# Patient Record
Sex: MALE | Race: BLACK OR AFRICAN AMERICAN | NOT HISPANIC OR LATINO | Employment: FULL TIME | URBAN - METROPOLITAN AREA
[De-identification: names, ages, dates, MRNs, and addresses within clinical notes are randomized per-mention and may not be internally consistent; named-entity substitution may affect disease eponyms.]

---

## 2017-10-26 ENCOUNTER — HOSPITAL ENCOUNTER (EMERGENCY)
Facility: CLINIC | Age: 27
Discharge: HOME | End: 2017-10-26
Attending: EMERGENCY MEDICINE | Admitting: EMERGENCY MEDICINE
Payer: COMMERCIAL

## 2017-10-26 ENCOUNTER — APPOINTMENT (OUTPATIENT)
Dept: GENERAL RADIOLOGY | Facility: CLINIC | Age: 27
End: 2017-10-26
Attending: EMERGENCY MEDICINE
Payer: COMMERCIAL

## 2017-10-26 VITALS
HEART RATE: 92 BPM | OXYGEN SATURATION: 100 % | RESPIRATION RATE: 16 BRPM | DIASTOLIC BLOOD PRESSURE: 111 MMHG | TEMPERATURE: 98.5 F | WEIGHT: 178.35 LBS | HEIGHT: 66 IN | SYSTOLIC BLOOD PRESSURE: 168 MMHG | BODY MASS INDEX: 28.66 KG/M2

## 2017-10-26 DIAGNOSIS — S16.1XXA STRAIN OF NECK MUSCLE, INITIAL ENCOUNTER: ICD-10-CM

## 2017-10-26 DIAGNOSIS — S39.012A BACK STRAIN, INITIAL ENCOUNTER: ICD-10-CM
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
## 2017-10-26 PROCEDURE — 25000132 ZZH RX MED GY IP 250 OP 250 PS 637: Performed by: EMERGENCY MEDICINE

## 2017-10-26 PROCEDURE — 99283 EMERGENCY DEPT VISIT LOW MDM: CPT

## 2017-10-26 PROCEDURE — 72040 X-RAY EXAM NECK SPINE 2-3 VW: CPT

## 2017-10-26 RX ORDER — IBUPROFEN 600 MG/1
600 TABLET, FILM COATED ORAL ONCE
Status: COMPLETED | OUTPATIENT
Start: 2017-10-26 | End: 2017-10-26

## 2017-10-26 RX ADMIN — IBUPROFEN 600 MG: 600 TABLET ORAL at 21:58

## 2017-10-26 ASSESSMENT — ENCOUNTER SYMPTOMS
NECK PAIN: 1
BACK PAIN: 1
NUMBNESS: 0
ABDOMINAL PAIN: 0
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
## 2017-10-26 NOTE — ED AVS SNAPSHOT
Emergency Department    [address] [place] [address]    Phone:  [phone]    Fax:  [phone]                                       Sugar Holland   MRN: [id number]    Department:   Emergency Department   Date of Visit:  10/26/2017           After Visit Summary Signature Page     I have received my discharge instructions, and my questions have been answered. I have discussed any challenges I see with this plan with the nurse or doctor.    ..........................................................................................................................................  Patient/Patient Representative Signature      ..........................................................................................................................................  Patient Representative Print Name and Relationship to Patient    ..................................................               ................................................  Date                                            Time    ..........................................................................................................................................  Reviewed by Signature/Title    ...................................................              ..............................................  Date                                                            Time
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
## 2020-05-20 ENCOUNTER — TRANSCRIBE ORDERS (OUTPATIENT)
Dept: OTHER | Age: 30
End: 2020-05-20

## 2021-12-14 ENCOUNTER — LAB REQUISITION (OUTPATIENT)
Dept: LAB | Facility: CLINIC | Age: 31
End: 2021-12-14

## 2021-12-14 DIAGNOSIS — E10.319 TYPE 1 DIABETES MELLITUS WITH UNSPECIFIED DIABETIC RETINOPATHY WITHOUT MACULAR EDEMA (H): ICD-10-CM

## 2021-12-14 DIAGNOSIS — I10 ESSENTIAL (PRIMARY) HYPERTENSION: ICD-10-CM

## 2021-12-14 LAB
ALBUMIN SERPL-MCNC: 2 G/DL (ref 3.4–5)
ANION GAP SERPL CALCULATED.3IONS-SCNC: 8 MMOL/L (ref 3–14)
BUN SERPL-MCNC: 28 MG/DL (ref 7–30)
CALCIUM SERPL-MCNC: 8.4 MG/DL (ref 8.5–10.1)
CHLORIDE BLD-SCNC: 112 MMOL/L (ref 94–109)
CO2 SERPL-SCNC: 24 MMOL/L (ref 20–32)
CREAT SERPL-MCNC: 4.18 MG/DL (ref 0.66–1.25)
CREAT UR-MCNC: 85 MG/DL
GFR SERPL CREATININE-BSD FRML MDRD: 18 ML/MIN/1.73M2
GLUCOSE BLD-MCNC: 285 MG/DL (ref 70–99)
MICROALBUMIN UR-MCNC: 4670 MG/L
MICROALBUMIN/CREAT UR: 5494.12 MG/G CR (ref 0–17)
PHOSPHATE SERPL-MCNC: 3 MG/DL (ref 2.5–4.5)
POTASSIUM BLD-SCNC: 3.6 MMOL/L (ref 3.4–5.3)
SODIUM SERPL-SCNC: 144 MMOL/L (ref 133–144)

## 2021-12-14 PROCEDURE — 82043 UR ALBUMIN QUANTITATIVE: CPT | Performed by: INTERNAL MEDICINE

## 2021-12-14 PROCEDURE — 80069 RENAL FUNCTION PANEL: CPT | Performed by: INTERNAL MEDICINE

## 2022-01-14 ENCOUNTER — TRANSFERRED RECORDS (OUTPATIENT)
Dept: HEALTH INFORMATION MANAGEMENT | Facility: CLINIC | Age: 32
End: 2022-01-14

## 2022-03-24 ENCOUNTER — TRANSFERRED RECORDS (OUTPATIENT)
Dept: HEALTH INFORMATION MANAGEMENT | Facility: CLINIC | Age: 32
End: 2022-03-24

## 2022-09-27 ENCOUNTER — MEDICAL CORRESPONDENCE (OUTPATIENT)
Dept: HEALTH INFORMATION MANAGEMENT | Facility: CLINIC | Age: 32
End: 2022-09-27

## 2022-09-28 ENCOUNTER — LAB REQUISITION (OUTPATIENT)
Dept: LAB | Facility: CLINIC | Age: 32
End: 2022-09-28

## 2022-09-28 DIAGNOSIS — E10.319 TYPE 1 DIABETES MELLITUS WITH UNSPECIFIED DIABETIC RETINOPATHY WITHOUT MACULAR EDEMA (H): ICD-10-CM

## 2022-09-28 LAB
CHOLEST SERPL-MCNC: 170 MG/DL
HDLC SERPL-MCNC: 39 MG/DL
LDLC SERPL CALC-MCNC: 90 MG/DL
NONHDLC SERPL-MCNC: 131 MG/DL
TRIGL SERPL-MCNC: 206 MG/DL

## 2022-09-28 PROCEDURE — 80061 LIPID PANEL: CPT | Performed by: FAMILY MEDICINE

## 2022-10-20 ENCOUNTER — REFERRAL (OUTPATIENT)
Dept: TRANSPLANT | Facility: CLINIC | Age: 32
End: 2022-10-20

## 2022-10-20 DIAGNOSIS — I10 ESSENTIAL HYPERTENSION: ICD-10-CM

## 2022-10-20 DIAGNOSIS — Z01.818 ENCOUNTER FOR PRE-TRANSPLANT EVALUATION FOR KIDNEY AND PANCREAS TRANSPLANT: ICD-10-CM

## 2022-10-20 DIAGNOSIS — Z76.82 ORGAN TRANSPLANT CANDIDATE: ICD-10-CM

## 2022-10-20 DIAGNOSIS — Z87.891 HISTORY OF TOBACCO USE: ICD-10-CM

## 2022-10-20 DIAGNOSIS — N18.9 CHRONIC RENAL FAILURE: ICD-10-CM

## 2022-10-20 DIAGNOSIS — E10.9 TYPE 1 DIABETES MELLITUS (H): ICD-10-CM

## 2022-10-20 DIAGNOSIS — N18.5 CHRONIC KIDNEY DISEASE, STAGE 5, KIDNEY FAILURE (H): Primary | ICD-10-CM

## 2022-10-20 DIAGNOSIS — N18.5 CHRONIC KIDNEY DISEASE, STAGE V (H): ICD-10-CM

## 2022-10-20 DIAGNOSIS — E10.9 DIABETES MELLITUS TYPE 1 (H): ICD-10-CM

## 2022-10-20 NOTE — LETTER
October 28, 2022        Sugar Holland  [address]  [facility] [address]      Dear Sugar,       You have recently expressed interest in our Solid Organ Transplant Program and have requested to begin the evaluation process.  We have made several attempts to get in touch with you but have been unable to reach you.    If you are still interested and/or have any questions, please contact us at  [phone] or [phone] and ask to speak with the .      Monday - Friday, between the hours of 8:00am and 5:00pm central time.    We look forward to hearing from you.      Regards,     Solid Organ Transplant Intake Team  [place] [address] [facility]  [address] [facility] [address]  [place] [address]  
  Sugar Holland  [address]  [facility] [address]                November 25, 2022          Dear Sugar,       You have recently expressed interest in our Solid Organ Transplant Program and have requested to begin the evaluation process.  We have made several attempts to get in touch with you but have been unable to reach you.    If you are still interested and/or have any questions, please contact us at  [phone] or [phone] and ask to speak with the .      Monday - Friday, between the hours of 8:00am and 5:00pm central time.    We look forward to hearing from you.      Regards,     Solid Organ Transplant Intake Team  [facility]  [address]  [address]  [place] [address]    
December 26, 2022    Sugar Holland  [address]  [place] [address]      Dear Sugar,    Thank you for your interest in the Transplant Center at [facility]. We look forward to being a part of your care team and assisting you through the transplant process.    As we discussed, your transplant coordinator is aDrcy Ingram (Pancreas, Kidney).  You may call your coordinator at any time with questions or concerns at call [phone].    Please complete the following.    1. Fill out and return the enclosed forms    Authorization for Electronic Communication    Authorization to Discuss Protected Health Information    Authorization for Release of Protected Health Information    Authorization for Care Everywhere Release of Information    2. Sign up for:    Mor.sl, access to your electronic medical record (see enclosed pamphlet)    [email or web address], a transplant education website    You can use these tools to learn more about your transplant, communicate with your care team, and track your medical details      Sincerely,      Solid Organ Transplant  [facility]    cc: Referring Physician and PCP   
(4) rarely moist

## 2022-11-08 NOTE — TELEPHONE ENCOUNTER
The Intake Pod has attempted to reach patient via phone x 3, with the numbers provided in chart. Trying to reach you letter also mailed to patient over seven days ago, with no response from patient. Per work flow process, patient is being removed from the intake pods reporting work bench. The assigned RN coordinator for this patient will be notified that Intake Pod was unable to reach patient to initiate referral process.      PRERNA Joseph, LPN   Transplant

## 2022-11-17 NOTE — TELEPHONE ENCOUNTER
Dr. Roman called to check on status of the referral he placed. Advised we attempted to reach out to patient and have not had a response.    Provider stated number on file was incorrect and is requesting we reach out to patient to complete referral.     Writer updated contact number for patient.

## 2022-12-16 VITALS — HEIGHT: 66 IN | WEIGHT: 190 LBS | BODY MASS INDEX: 30.53 KG/M2

## 2022-12-16 NOTE — TELEPHONE ENCOUNTER
PCP: Dr. Millie Leonardo  Referring Provider: Dr. William Roman   Referring Diagnosis: CKD Stage 5/Type 1 DM     GFR/Date: 12    Is patient under the age of 65? Yes  Is patient diabetic? Yes  Is patient on insulin? Yes  Was patient offered a pancreas transplant referral? Yes    Is patient in a group home/assisted living? No  Does patient have a guardian? No    Referral intake process completed.  Patient is aware that after financial approval is received, medical records will be requested.   Patient confirmed for a callback from transplant coordinator on 12/26/22. (within 2 weeks)  Tentative evaluation date 1/18/23 (within 4 weeks) if appointment is virtual, does patient have capabilities of setting this up? No    Confirmed coordinator will discuss evaluation process in more detail at the time of their call.   Patient is aware of the need to arrange age appropriate cancer screening, vaccinations, and dental care.  Reminded patient to complete questionnaire, complete medical records release, and review packet prior to evaluation visit .  Assessed patient for special needs (ie-wheelchair, assistance, guardian, and ):  None   Patient instructed to call [phone] with questions.     Patient gave verbal consent during intake call to obtain medical records and documents outside of [facility]:  Yes     PRERNA Joseph, LPN   Transplant

## 2022-12-26 NOTE — TELEPHONE ENCOUNTER
Contacted patient and introduced myself as their Transplant Coordinator, also introduced the role of the Transplant Coordinator in the transplant process.  Explained the purpose of this call including reviewing next steps and answering questions.    Confirmed Referring Provider, Dialysis Center, and Primary Care Physician. Notified patient of the importance of continued communication with referring providers and primary care physicians.    Reviewed components of transplant evaluation process including necessary appointments, tests, and procedures.    Answered questions for patient regarding evaluation, provided my name and contact information and requested they call with any additional questions.    Determined that patient would like additional information regarding transplant by:     Drop Down choices: Mail, Email, MyChart, Phone Call   Encourage MyChart   Notified patients that they will hear from a Transplant  to schedule evaluation.       Reviewed pt's chart for pre-Kidney/pancreas transplant evaluation planning. Pt lives in [place], [place]. Pt has CKD likely d/t diabetic nephropathy. Pt is not yet on dialysis and follows w/ Dr. Roman.  Pt is a type 1 diabetic, diagnosed at age 13.  He uses a Dex-com sensor and gives himself insulini njections. Other hx includes HTN.  BMI 31.  Dental: not UTD.  Pt is a former smoker - reports he smoked for about 7 years.  He denies issues w/ alcohol and recreational drug abuse. Pt is indepdent w/ ADLs.  Pt lives alone, encouraged him to start thinking about who could be his potential caregiver.  Pt states his father is a potential donor.       I also introduced [employer] and asked pt to create an account and view pre-kidney transplant videos for review with me following evaluation. Confirmed STD 1/18/23. Informed pt they will hear from scheduling to arrange the evaluation. Smartset orders entered, chart routed to scheduling pool.

## 2023-01-12 ENCOUNTER — MEDICAL CORRESPONDENCE (OUTPATIENT)
Dept: HEALTH INFORMATION MANAGEMENT | Facility: CLINIC | Age: 33
End: 2023-01-12

## 2023-01-17 LAB
A1 AB TITR SERPL: 16 {TITER}
A1 AB TITR SERPL: 16 {TITER}
A2 AB TITR SERPL: 2 {TITER}
A2 AB TITR SERPL: 2 {TITER}
ABO/RH(D): NORMAL
ABO/RH(D): NORMAL
ANTIBODY SCREEN: NEGATIVE
ANTIBODY TITER IGM SCREEN: NEGATIVE
SPECIMEN EXPIRATION DATE: NORMAL

## 2023-01-17 NOTE — PROGRESS NOTES
"[facility]  Outpatient MNT: Kidney Pancreas Transplant Evaluation    Current BMI: 31.6 (HT 66.5 in,  lbs/90 kg)  BMI is within recommendation of <35 for KP transplant  Ideal BMI for pancreas transplant 30-32 / per surgeon discretion    Frailty Assessment -- Not Frail (2/5 points)- reduced , low activity      Time Spent: 15 minutes  Visit Type: Initial   Referring Physician: Adebayo  Pt accompanied by: his mom     History of previous txp: none   Dialysis: no     Nutrition Assessment  H/o DM I  Frequency of BG checks: dexcom   Hypoglycemia: 1x/day (50-70), unawareness: some  Last A1c: 8.6 (1/12/23)    Endocrinologist manages insulin; pt sees endo every 3 months   Does not meet with CDE for fine tuning insulin regimen  He reports he is on a set insulin regimen of taking 25 units with meals and then for snacks he \"wings it\"- giving himself a range of insulin.    - Appetite: good/baseline   - Food allergies/intolerances: none  - Meal prep & grocery shopping: pt does   - Previous RD education: no   - Issues chewing or swallowing: no   - N/V/D/C: no   - Food access concerns: no     Vitamins, Supplements, Pertinent Meds: none  Herbal Medicines/Supplements: Sanar Colon Cleanse- not daily; ashwagandha; another supplement that he could not recall     Sanar Colon Cleanser (includes many herbals, but below with negative documented adverse effects)  - Cascara: some concern with possible liver injury  - Aloe vera: some concern for liver injury + Orally, aloe latex can cause hemorrhagic gastritis, nephritis, and acute kidney failure following prolonged use of high doses (1 gram daily or more)  - Horsetail: some case reports of hyponatremia     Edema: yes     Weight hx: up/down within 25 lbs over the past year   Wt Readings from Last 10 Encounters:   01/18/23 90.3 kg (199 lb)   12/16/22 86.2 kg (190 lb)   10/26/17 80.9 kg (178 lb 5.6 oz)   06/20/16 74.8 kg (165 lb)   06/09/14 79.4 kg (175 " lb)     Diet Recall  Breakfast Parikh & eggs with potatoes; sausage/parikh/eggs/cheese on english muffin   Lunch Slice of pizza; hit or miss based on schedule    Dinner Tacos x 2 (homemade); ground beef with cream of mushroom soup + garlic bread    Snacks Chips, fruit   Beverages Water, 1/2 pop a few times/week (Pepsi), some juice    Alcohol None    Dining out 2x/month      Physical Activity  None; no physical limitations       Labs  11/17/22 K 4.6 Phos 4.6    Nutrition Diagnosis  Obesity r/t positive energy balance and inadequate physical activity AEB BMI 31.6.    Nutrition Intervention  Nutrition education provided:  Discussed sodium intake (low sodium foods and drinks, seasoning food without salt and tips for low sodium diet).  Look for LS canned soup, taco seasoning, etc. Pt reports he adds a lot of salt to foods, so we discussed cutting back on this as well.   Reviewed wnl K/Phos levels, which do not warrant further dietary modification at this time.   Reviewed BG management. Could meet with CDE for more fine tuning of insulin. We discussed benefits of a lower cho diet for both BG management, stability, and weight loss. If he does have interest in doing this, he needs to talk w/ endo about adjusting insulin regimen to perhaps an I:C ratio. Reviewed some examples of what a low cho diet would look like: eggs/parikh without starch or with low carb bread; taco salad or tacos on low carb tortilla; add more veggies in with ground beef and do less garlic bread, etc. Strongly recommended avoiding juice/pop.     Reviewed post txp diet guidelines in brief (will review in further detail post txp):  (1) Review of proper food safety measures d/t immunosuppressant therapy post-op and increased risk for food-borne illness    (2) Avoid the following post txp d/t risk for rejection, unknown effects on the organs, and/or potential interactions with immunosuppressants:  - Herbal, Chinese, holistic, chiropractic, natural, alternative  medicines and supplements  - Detoxes and cleanses  - Weight loss pills  - Protein powders or other products with extracts or herbs (ie green tea extract)    (3) Med regimen and possible side effects    Patient Understanding: Pt verbalized understanding of education provided.  Expected Engagement: Good  Follow-Up Plans: PRN     Nutrition Goals  Weight loss per surgeon discretion for txp    Janeth Frias RD, LD, CCTD

## 2023-01-18 ENCOUNTER — ALLIED HEALTH/NURSE VISIT (OUTPATIENT)
Dept: TRANSPLANT | Facility: CLINIC | Age: 33
End: 2023-01-18
Attending: NURSE PRACTITIONER
Payer: COMMERCIAL

## 2023-01-18 ENCOUNTER — APPOINTMENT (OUTPATIENT)
Dept: TRANSPLANT | Facility: CLINIC | Age: 33
End: 2023-01-18
Attending: STUDENT IN AN ORGANIZED HEALTH CARE EDUCATION/TRAINING PROGRAM
Payer: COMMERCIAL

## 2023-01-18 ENCOUNTER — DOCUMENTATION ONLY (OUTPATIENT)
Dept: TRANSPLANT | Facility: CLINIC | Age: 33
End: 2023-01-18

## 2023-01-18 ENCOUNTER — LAB (OUTPATIENT)
Dept: LAB | Facility: CLINIC | Age: 33
End: 2023-01-18
Payer: COMMERCIAL

## 2023-01-18 ENCOUNTER — ANCILLARY PROCEDURE (OUTPATIENT)
Dept: GENERAL RADIOLOGY | Facility: CLINIC | Age: 33
End: 2023-01-18
Attending: NURSE PRACTITIONER
Payer: COMMERCIAL

## 2023-01-18 ENCOUNTER — ANCILLARY PROCEDURE (OUTPATIENT)
Dept: CARDIOLOGY | Facility: CLINIC | Age: 33
End: 2023-01-18
Attending: NURSE PRACTITIONER
Payer: COMMERCIAL

## 2023-01-18 VITALS
HEART RATE: 82 BPM | WEIGHT: 199 LBS | DIASTOLIC BLOOD PRESSURE: 80 MMHG | BODY MASS INDEX: 31.23 KG/M2 | OXYGEN SATURATION: 98 % | SYSTOLIC BLOOD PRESSURE: 137 MMHG | HEIGHT: 67 IN

## 2023-01-18 DIAGNOSIS — I10 ESSENTIAL HYPERTENSION: ICD-10-CM

## 2023-01-18 DIAGNOSIS — Z76.82 ORGAN TRANSPLANT CANDIDATE: ICD-10-CM

## 2023-01-18 DIAGNOSIS — E10.9 DIABETES MELLITUS TYPE 1 (H): ICD-10-CM

## 2023-01-18 DIAGNOSIS — N18.4 CKD (CHRONIC KIDNEY DISEASE) STAGE 4, GFR 15-29 ML/MIN (H): ICD-10-CM

## 2023-01-18 DIAGNOSIS — N18.9 CHRONIC RENAL FAILURE: ICD-10-CM

## 2023-01-18 DIAGNOSIS — E10.9 TYPE 1 DIABETES MELLITUS (H): ICD-10-CM

## 2023-01-18 DIAGNOSIS — Z01.818 ENCOUNTER FOR PRE-TRANSPLANT EVALUATION FOR KIDNEY AND PANCREAS TRANSPLANT: ICD-10-CM

## 2023-01-18 DIAGNOSIS — E10.22 TYPE 1 DIABETES MELLITUS WITH STAGE 4 CHRONIC KIDNEY DISEASE (H): ICD-10-CM

## 2023-01-18 DIAGNOSIS — Z87.891 HISTORY OF TOBACCO USE: ICD-10-CM

## 2023-01-18 DIAGNOSIS — N18.5 CHRONIC KIDNEY DISEASE, STAGE V (H): ICD-10-CM

## 2023-01-18 DIAGNOSIS — E10.21 TYPE 1 DIABETES MELLITUS WITH NEPHROPATHY (H): ICD-10-CM

## 2023-01-18 DIAGNOSIS — N18.4 TYPE 1 DIABETES MELLITUS WITH STAGE 4 CHRONIC KIDNEY DISEASE (H): ICD-10-CM

## 2023-01-18 DIAGNOSIS — N18.5 CHRONIC KIDNEY DISEASE, STAGE 5, KIDNEY FAILURE (H): ICD-10-CM

## 2023-01-18 DIAGNOSIS — Z01.818 ENCOUNTER FOR PRE-TRANSPLANT EVALUATION FOR KIDNEY AND PANCREAS TRANSPLANT: Primary | ICD-10-CM

## 2023-01-18 DIAGNOSIS — N18.5 CHRONIC RENAL FAILURE, STAGE 5 (H): ICD-10-CM

## 2023-01-18 DIAGNOSIS — E10.22 TYPE 1 DIABETES MELLITUS WITH STAGE 5 CHRONIC KIDNEY DISEASE NOT ON CHRONIC DIALYSIS (H): ICD-10-CM

## 2023-01-18 DIAGNOSIS — N18.5 TYPE 1 DIABETES MELLITUS WITH STAGE 5 CHRONIC KIDNEY DISEASE NOT ON CHRONIC DIALYSIS (H): ICD-10-CM

## 2023-01-18 DIAGNOSIS — I15.0 RENOVASCULAR HYPERTENSION: ICD-10-CM

## 2023-01-18 DIAGNOSIS — N18.5 CHRONIC KIDNEY DISEASE, STAGE 5, KIDNEY FAILURE (H): Primary | ICD-10-CM

## 2023-01-18 LAB
ALBUMIN SERPL BCG-MCNC: 3.9 G/DL (ref 3.5–5.2)
ALBUMIN UR-MCNC: 300 MG/DL
ALP SERPL-CCNC: 113 U/L (ref 40–129)
ALT SERPL W P-5'-P-CCNC: 35 U/L (ref 10–50)
ANION GAP SERPL CALCULATED.3IONS-SCNC: 15 MMOL/L (ref 7–15)
APPEARANCE UR: CLEAR
APTT PPP: 27 SECONDS (ref 22–38)
AST SERPL W P-5'-P-CCNC: 31 U/L (ref 10–50)
BASOPHILS # BLD AUTO: 0 10E3/UL (ref 0–0.2)
BASOPHILS NFR BLD AUTO: 1 %
BILIRUB SERPL-MCNC: 0.3 MG/DL
BILIRUB UR QL STRIP: NEGATIVE
BUN SERPL-MCNC: 78.6 MG/DL (ref 6–20)
CALCIUM SERPL-MCNC: 9.2 MG/DL (ref 8.6–10)
CHLORIDE SERPL-SCNC: 108 MMOL/L (ref 98–107)
COLOR UR AUTO: ABNORMAL
CREAT SERPL-MCNC: 7.24 MG/DL (ref 0.67–1.17)
DEPRECATED HCO3 PLAS-SCNC: 18 MMOL/L (ref 22–29)
EOSINOPHIL # BLD AUTO: 0.4 10E3/UL (ref 0–0.7)
EOSINOPHIL NFR BLD AUTO: 6 %
ERYTHROCYTE [DISTWIDTH] IN BLOOD BY AUTOMATED COUNT: 12.6 % (ref 10–15)
FACTOR 2 INTERPRETATION: NORMAL
FACTOR V INTERPRETATION: NORMAL
GFR SERPL CREATININE-BSD FRML MDRD: 10 ML/MIN/1.73M2
GLUCOSE SERPL-MCNC: 103 MG/DL (ref 70–99)
GLUCOSE UR STRIP-MCNC: 150 MG/DL
HBA1C MFR BLD: 9.1 %
HBV CORE AB SERPL QL IA: NONREACTIVE
HCT VFR BLD AUTO: 36.3 % (ref 40–53)
HCV AB SERPL QL IA: NONREACTIVE
HGB BLD-MCNC: 11.9 G/DL (ref 13.3–17.7)
HGB UR QL STRIP: ABNORMAL
IMM GRANULOCYTES # BLD: 0 10E3/UL
IMM GRANULOCYTES NFR BLD: 0 %
INR PPP: 0.92 (ref 0.85–1.15)
KETONES UR STRIP-MCNC: NEGATIVE MG/DL
LAB DIRECTOR COMMENTS: NORMAL
LAB DIRECTOR DISCLAIMER: NORMAL
LAB DIRECTOR INTERPRETATION: NORMAL
LAB DIRECTOR METHODOLOGY: NORMAL
LAB DIRECTOR RESULTS: NORMAL
LEUKOCYTE ESTERASE UR QL STRIP: NEGATIVE
LVEF ECHO: NORMAL
LYMPHOCYTES # BLD AUTO: 2.3 10E3/UL (ref 0.8–5.3)
LYMPHOCYTES NFR BLD AUTO: 30 %
MCH RBC QN AUTO: 26.7 PG (ref 26.5–33)
MCHC RBC AUTO-ENTMCNC: 32.8 G/DL (ref 31.5–36.5)
MCV RBC AUTO: 81 FL (ref 78–100)
MONOCYTES # BLD AUTO: 0.6 10E3/UL (ref 0–1.3)
MONOCYTES NFR BLD AUTO: 8 %
NEUTROPHILS # BLD AUTO: 4.2 10E3/UL (ref 1.6–8.3)
NEUTROPHILS NFR BLD AUTO: 55 %
NITRATE UR QL: NEGATIVE
NRBC # BLD AUTO: 0 10E3/UL
NRBC BLD AUTO-RTO: 0 /100
PH UR STRIP: 6 [PH] (ref 5–7)
PLATELET # BLD AUTO: 306 10E3/UL (ref 150–450)
POTASSIUM SERPL-SCNC: 4.2 MMOL/L (ref 3.4–5.3)
PROT SERPL-MCNC: 7.5 G/DL (ref 6.4–8.3)
RBC # BLD AUTO: 4.46 10E6/UL (ref 4.4–5.9)
RBC URINE: 2 /HPF
SODIUM SERPL-SCNC: 141 MMOL/L (ref 136–145)
SP GR UR STRIP: 1.01 (ref 1–1.03)
SPECIMEN DESCRIPTION: NORMAL
T PALLIDUM AB SER QL: NONREACTIVE
UROBILINOGEN UR STRIP-MCNC: NORMAL MG/DL
WBC # BLD AUTO: 7.6 10E3/UL (ref 4–11)
WBC URINE: 3 /HPF

## 2023-01-18 PROCEDURE — 36415 COLL VENOUS BLD VENIPUNCTURE: CPT

## 2023-01-18 PROCEDURE — 86787 VARICELLA-ZOSTER ANTIBODY: CPT

## 2023-01-18 PROCEDURE — 86706 HEP B SURFACE ANTIBODY: CPT

## 2023-01-18 PROCEDURE — 86644 CMV ANTIBODY: CPT

## 2023-01-18 PROCEDURE — 85025 COMPLETE CBC W/AUTO DIFF WBC: CPT

## 2023-01-18 PROCEDURE — 85730 THROMBOPLASTIN TIME PARTIAL: CPT

## 2023-01-18 PROCEDURE — 86803 HEPATITIS C AB TEST: CPT

## 2023-01-18 PROCEDURE — 86833 HLA CLASS II HIGH DEFIN QUAL: CPT

## 2023-01-18 PROCEDURE — 85390 FIBRINOLYSINS SCREEN I&R: CPT | Mod: 26 | Performed by: PATHOLOGY

## 2023-01-18 PROCEDURE — 81240 F2 GENE: CPT

## 2023-01-18 PROCEDURE — 93306 TTE W/DOPPLER COMPLETE: CPT | Performed by: INTERNAL MEDICINE

## 2023-01-18 PROCEDURE — G0463 HOSPITAL OUTPT CLINIC VISIT: HCPCS

## 2023-01-18 PROCEDURE — 86704 HEP B CORE ANTIBODY TOTAL: CPT

## 2023-01-18 PROCEDURE — 86665 EPSTEIN-BARR CAPSID VCA: CPT

## 2023-01-18 PROCEDURE — 85670 THROMBIN TIME PLASMA: CPT

## 2023-01-18 PROCEDURE — 71046 X-RAY EXAM CHEST 2 VIEWS: CPT | Mod: GC | Performed by: RADIOLOGY

## 2023-01-18 PROCEDURE — 86886 COOMBS TEST INDIRECT TITER: CPT

## 2023-01-18 PROCEDURE — G0452 MOLECULAR PATHOLOGY INTERPR: HCPCS | Mod: 26 | Performed by: PATHOLOGY

## 2023-01-18 PROCEDURE — 81382 HLA II TYPING 1 LOC HR: CPT | Mod: XU

## 2023-01-18 PROCEDURE — 83036 HEMOGLOBIN GLYCOSYLATED A1C: CPT

## 2023-01-18 PROCEDURE — 81001 URINALYSIS AUTO W/SCOPE: CPT

## 2023-01-18 PROCEDURE — 99205 OFFICE O/P NEW HI 60 MIN: CPT

## 2023-01-18 PROCEDURE — 85610 PROTHROMBIN TIME: CPT

## 2023-01-18 PROCEDURE — 87340 HEPATITIS B SURFACE AG IA: CPT

## 2023-01-18 PROCEDURE — 86481 TB AG RESPONSE T-CELL SUSP: CPT

## 2023-01-18 PROCEDURE — 84681 ASSAY OF C-PEPTIDE: CPT

## 2023-01-18 PROCEDURE — 86832 HLA CLASS I HIGH DEFIN QUAL: CPT

## 2023-01-18 PROCEDURE — 99215 OFFICE O/P EST HI 40 MIN: CPT

## 2023-01-18 PROCEDURE — 86147 CARDIOLIPIN ANTIBODY EA IG: CPT

## 2023-01-18 PROCEDURE — 86780 TREPONEMA PALLIDUM: CPT

## 2023-01-18 PROCEDURE — 86901 BLOOD TYPING SEROLOGIC RH(D): CPT

## 2023-01-18 PROCEDURE — 80053 COMPREHEN METABOLIC PANEL: CPT

## 2023-01-18 RX ORDER — PROCHLORPERAZINE 25 MG/1
SUPPOSITORY RECTAL
COMMUNITY
Start: 2023-01-10

## 2023-01-18 RX ORDER — PROCHLORPERAZINE 25 MG/1
SUPPOSITORY RECTAL
COMMUNITY
Start: 2022-11-09

## 2023-01-18 RX ORDER — CARVEDILOL 25 MG/1
25 TABLET ORAL
COMMUNITY
Start: 2022-09-27 | End: 2023-09-27

## 2023-01-18 RX ORDER — AMLODIPINE BESYLATE 10 MG/1
10 TABLET ORAL
COMMUNITY
Start: 2021-12-14 | End: 2023-03-24

## 2023-01-18 RX ORDER — BISMUTH SUBSALICYLATE 262MG/15ML
SUSPENSION, ORAL (FINAL DOSE FORM) ORAL
COMMUNITY
Start: 2022-10-27

## 2023-01-18 RX ORDER — PEN NEEDLE, DIABETIC 32GX 5/32"
NEEDLE, DISPOSABLE MISCELLANEOUS
COMMUNITY
Start: 2023-01-12

## 2023-01-18 RX ORDER — INSULIN DETEMIR 100 [IU]/ML
50 INJECTION, SOLUTION SUBCUTANEOUS
COMMUNITY
Start: 2022-10-20

## 2023-01-18 RX ORDER — PROCHLORPERAZINE 25 MG/1
SUPPOSITORY RECTAL
COMMUNITY
Start: 2023-01-12

## 2023-01-18 NOTE — PROGRESS NOTES
"Psychosocial Assessment  Patient Name/ Age: Sugar Holland 32 year old   Medical Record #: [id number]  Duration of Interview:     50  min  Process:   Face-to-Face Interview                (counseling < 50%)   Present at Appointment: Sugar and his mother Kaitlynn        :ORTIZ Barahona, [facility] Date:  January 18, 2023        Type of transplant: Kidney/Pancreas    Donor type:   Sugar indicated his father has expressed interest in being a kidney donor.   Cadaver and parent   Prior Transplants:    No Status of Transplant:       Current Living Situation    Location:   [address] [place] [address] [address]  [place] [address]  With Whom: lives alone       Family/ Social Support:    Sugar indicated he has two children Lizbeth (12) lives in [place], [place] and Sada (6) [place], [place].  Kaitlynn lives in [place], [place] and his father also lives in [place], [place] but not together.  Sugar has two sisters ([place], [place]).   Available, helpful   Committed relationship:   Single   Other supports:   Sugar indicated he does not have any friends. None available       Activities/ Functional Ability    Current level: Ambulatory and independent with ADL's     Transportation Drives self       Vocational/Employment/Financial     Employment   Unemployed   Job Description      Income   Other: Sugar indicated he is financial reliant on \"people\".  Did not emphasize who these \"people\" were.     Insurance  [employer] [place]    At this time, patient can afford medication costs:  Yes  [place]       Medical Status    Current mode of treatment for ESRD None   Complications - Diabetes controlled with insulin. None       Behavioral    Tobacco Use No Chemical Dependency No    Sugar indicated he used to use marijuana when he was younger but no longer.     Psychiatric Impairment No    Reading ability Good  Education Level: GED Recent Legal History No    Coping Style/Strategies: Sugar indicated when under stress he will get quiet.     Ability " to Adhere to Complex Medical Regime: Yes     Adherence History:  Sugar indicated he will usually follow his physician's recommendations.  He admitted he did not always follow medical recommendations prior to being 17 but then he realized he needed to be compliant with his medical issues.        Education  _X_ Medicare  _X_ Rehabilitation  _X_ Donor issues  _X_ Community resources  _X_ Post discharge housing  _X_ Financial resources  _X_ Medical insurance options  _X_ Psych adjustment  _X_ Family adjustment  _X_ Health Care Directive - Provided Education and Declined Completing at this time.  Sugar indicated he is in agreement with his parents making his medical decisions for him if he is unable. Psychosocial Risks of Transplant Reviewed and Discussed:  _X_ Increased stress related to emotional,            family, social, employment or financial           situation  _X_ Affect on work and/or disability benefits  _X_ Affect on future life insurance  _X_ Transplant outcome expectations may           not be met  _X_ Mental Health Risks: anxiety,           depression, PTSD, guilt, grief and           chronic fatigue     Notable Items:   None noted.       Final Evaluation/Assessment   Patient seemed to process information well. Appeared well informed, motivated and able to follow post transplant requirements. Behavior was appropriate during interview. Has adequate income and insurance coverage. Adequate social support. No major contraindications noted for transplant.  At this time patient appears to understand the risks and benefits of transplant.      Recommendation  Acceptable    Selection Criteria Met:  Plan for support Yes   Chemical Dependence Yes   Smoking Yes   Mental Health Yes   Adequate Finances Yes    Signature: ORTIZ Barahona, [facility]   Title: Clinical

## 2023-01-18 NOTE — PROGRESS NOTES
TRANSPLANT NEPHROLOGY RECIPIENT EVALUATION NOTE    Assessment and Plan:  # Kidney/Pancreas Transplant Evaluation: Patient is a good candidate overall. Benefits of a living donor transplant were discussed.    # CKD from diabetes mellitus type 1: GFR 12. When ready, he may benefit from kidney transplant.     # DM Type 1: A1c 9.1% using 145 units of insulin daily. C peptide <0.1%.     # BMI 31     # Cardiac Risk: normal stress test and ECHO in 2018, will need risk assessment.     # PAD Screening: will need CT.     # Health Maintenance: Dental: Not up to date    Discussed the risks and benefits of a transplant, including the risk of surgery and immunosuppression medications.  Patient's overall evaluation will be discussed in the Transplant Program's regular meeting with a final recommendation on the patients suitability for transplant to be made at that time.    Pending completion of the full evaluation, patient presently appears to be enough of an acceptable kidney transplant recipient candidate to have any potential kidney donors start the evaluation process.      Evaluation:  Sugar Holland was seen in consultation at the request of Dr. Velma Fiore for evaluation as a potential kidney/pancreas transplant recipient.    Reason for Visit:  Sugar Holland is a 32 year old male with CKD from diabetes mellitus type 1, who presents for kidney/pancreas transplant evaluation.    History of Present Illness:  Sugar Holland is a 32-year-old AA male with CKD likely d/t diabetic nephropathy. Proteinuria since 2016, CKD since 2018, GFR 21 in 4/2020 with nephrotic proteinuria (- SPEP). Had HERLINDA in 1/2022 (peak sCr 6.0) in the setting of COVID and poorly controlled diabetes. Most recent GFR 12.            Kidney Disease Hx:          Kidney Disease Dx: Diabetes mellitus type 1       Biopsy Proven: No         On Dialysis: No       Primary Nephrologist: Dr. Roman        H/o Kidney Stones: No       H/o Recurrent/Frequent  UTI: No         Diabetic Hx: Type 1        Diagnosis Date: age 13       Medication History: started insulin right away, currently using Novolog and Levemir 145 units total daily.        Diabetic Control: Poorly controlled (HbA1c >9%)   Last HbA1c: 8.6%       Hypoglycemic Unawareness: No, using a CGM ~200-225,        End-Organ Damage due to DM: Retinopathy, Nephropathy and Gastroparesis          Cardiac/Vascular Disease Risk Factors:        Cardiac Risk Factors: Diabetes, Hypertension and CKD       Known CAD: No       Known PAD/Caludication Symptoms: No       Known Heart Failure: No       Arrhythmia: No       Pulmonary Hypertension: No       Valvular Disease: No       Other: None         Viral Serology Status       CMV IgG Antibody: Unknown       EBV IgG Antibody: Unknown         Volume Status/Weight:        Volume status: Mildly hypervolemic       Weight:  Acceptable BMI       BMI: Body mass index is 31.6 kg/m .         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       Allergy Testing Questions:   Medication that caused a reaction None   Antibiotics used that didn't give an allergic reaction?  None    COVID Vaccination Up To Date: No, due for next dose    Potential Living Kidney Donors: Yes , dad     Review of Systems:  A comprehensive review of systems was obtained and negative, except as noted in the HPI or PMH.    Past Medical History:   Medical record was reviewed and PMH was discussed with patient and noted  below.  Past Medical History:   Diagnosis Date     Diabetes (H)      Diabetic retinopathy (H)      Hypertension        Past Social History:   Past Surgical History:   Procedure Laterality Date     EYE SURGERY       ORTHOPEDIC SURGERY       Personal history of bleeding or anesthesia problems: No    Family History:  No family history on file.    Personal History:   Social History     Socioeconomic History     Marital status: Single     Spouse name: Not on file     Number of children: Not on file     Years of education: Not on file     Highest education level: Not on file   Occupational History     Not on file   Tobacco Use     Smoking status: Every Day     Packs/day: 0.50     Types: Cigarettes     Smokeless tobacco: Never   Substance and Sexual Activity     Alcohol use: No     Drug use: No     Sexual activity: Not on file   Other Topics Concern     Not on file   Social History Narrative     Not on file     Social Determinants of Health     Financial Resource Strain: Not on file   Food Insecurity: Not on file   Transportation Needs: Not on file   Physical Activity: Not on file   Stress: Not on file   Social Connections: Not on file   Intimate Partner Violence: Not on file   Housing Stability: Not on file       Allergies:  No Known Allergies    Medications:  Current Outpatient Medications   Medication Sig     amLODIPine (NORVASC) 10 MG tablet Take 10 mg by mouth     BD NADYA U/F 32G X 4 MM insulin pen needle USE FOUR TIMES DAILY     blood glucose monitoring (ULTRA THIN 30G) lancets USE TO TEST FOUR TIMES DAILY AS DIRECTED.     carvedilol (COREG) 25 MG tablet Take 25 mg by mouth     Continuous Blood Gluc  (DEXCOM G6 ) VALENTIN FOR USE WITH CONTINUOUS GLUCOSE MONITORING SYSTEM.     Continuous Blood Gluc Sensor (DEXCOM G6 SENSOR) MISC CHANGE EVERY 10 DAYS     Continuous Blood Gluc Transmit (DEXCOM G6 TRANSMITTER) MISC CHANGE EVERY 3 MONTHS     insulin aspart (NOVOLOG FLEXPEN) 100 UNIT/ML soln Sliding scale  "dosage, as per your routine     Insulin Aspart (NOVOLOG SC)      insulin detemir (LEVEMIR FLEXTOUCH) 100 UNIT/ML pen Inject 50 Units Subcutaneous     insulin glargine (LANTUS SOLOSTAR) 100 UNIT/ML PEN 48 units at bedtime     HYDROcodone-acetaminophen (NORCO) 5-325 MG per tablet Take 1-2 tablets by mouth every 4 hours as needed for moderate to severe pain (Patient not taking: Reported on 1/18/2023)     insulin glargine (LANTUS VIAL) 100 UNIT/ML soln Inject Subcutaneous At Bedtime (Patient not taking: Reported on 1/18/2023)     LISINOPRIL PO  (Patient not taking: Reported on 1/18/2023)     No current facility-administered medications for this visit.       Vitals:  /80   Pulse 82   Ht 1.69 m (5' 6.54\")   Wt 90.3 kg (199 lb)   SpO2 98%   BMI 31.60 kg/m      Exam:  GENERAL APPEARANCE: alert and no distress  HENT: mouth without ulcers or lesions  LYMPHATICS: no cervical or supraclavicular nodes  RESP: lungs clear to auscultation - no rales, rhonchi or wheezes  CV: regular rhythm, normal rate, no rub, no murmur  EDEMA: no LE edema bilaterally  ABDOMEN: soft, nondistended, nontender, bowel sounds normal  MS: extremities normal - no gross deformities noted, no evidence of inflammation in joints, no muscle tenderness  SKIN: no rash    Results:   No results found for this or any previous visit (from the past 336 hour(s)).      "

## 2023-01-18 NOTE — LETTER
1/18/2023         RE: Sugar Holland  [address] [place]  [facility] [address]        Dear Colleague,    Thank you for referring your patient, Sugar Holland, to the [facility]. Please see a copy of my visit note below.    TRANSPLANT NEPHROLOGY RECIPIENT EVALUATION NOTE    Assessment and Plan:  # Kidney/Pancreas Transplant Evaluation: Patient is a good candidate overall. Benefits of a living donor transplant were discussed.    # CKD from diabetes mellitus type 1: GFR 12. When ready, he may benefit from kidney transplant.     # DM Type 1: A1c 9.1% using 145 units of insulin daily. C peptide <0.1%.     # BMI 31     # Cardiac Risk: normal stress test and ECHO in 2018, will need risk assessment.     # PAD Screening: will need CT.     # Health Maintenance: Dental: Not up to date    Discussed the risks and benefits of a transplant, including the risk of surgery and immunosuppression medications.  Patient's overall evaluation will be discussed in the Transplant Program's regular meeting with a final recommendation on the patients suitability for transplant to be made at that time.    Pending completion of the full evaluation, patient presently appears to be enough of an acceptable kidney transplant recipient candidate to have any potential kidney donors start the evaluation process.      Evaluation:  Sugar Holland was seen in consultation at the request of Dr. Velma Fiore for evaluation as a potential kidney/pancreas transplant recipient.    Reason for Visit:  Sugar Holland is a 32 year old male with CKD from diabetes mellitus type 1, who presents for kidney/pancreas transplant evaluation.    History of Present Illness:  Sugar Holland is a 32-year-old AA male with CKD likely d/t diabetic nephropathy. Proteinuria since 2016, CKD since 2018, GFR 21 in 4/2020 with nephrotic proteinuria (- SPEP). Had HERLINDA in 1/2022 (peak sCr 6.0) in the setting of COVID and poorly controlled  diabetes. Most recent GFR 12.            Kidney Disease Hx:          Kidney Disease Dx: Diabetes mellitus type 1       Biopsy Proven: No         On Dialysis: No       Primary Nephrologist: Dr. Roman        H/o Kidney Stones: No       H/o Recurrent/Frequent UTI: No         Diabetic Hx: Type 1        Diagnosis Date: age 13       Medication History: started insulin right away, currently using Novolog and Levemir 145 units total daily.        Diabetic Control: Poorly controlled (HbA1c >9%)   Last HbA1c: 8.6%       Hypoglycemic Unawareness: No, using a CGM ~200-225,        End-Organ Damage due to DM: Retinopathy, Nephropathy and Gastroparesis          Cardiac/Vascular Disease Risk Factors:        Cardiac Risk Factors: Diabetes, Hypertension and CKD       Known CAD: No       Known PAD/Caludication Symptoms: No       Known Heart Failure: No       Arrhythmia: No       Pulmonary Hypertension: No       Valvular Disease: No       Other: None         Viral Serology Status       CMV IgG Antibody: Unknown       EBV IgG Antibody: Unknown         Volume Status/Weight:        Volume status: Mildly hypervolemic       Weight:  Acceptable BMI       BMI: Body mass index is 31.6 kg/m .         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       Allergy Testing Questions:   Medication that caused a reaction None   Antibiotics used that didn't give an allergic reaction?  None    COVID Vaccination Up To Date: No, due for next dose    Potential  Living Kidney Donors: Yes , dad     Review of Systems:  A comprehensive review of systems was obtained and negative, except as noted in the HPI or PMH.    Past Medical History:   Medical record was reviewed and PMH was discussed with patient and noted below.  Past Medical History:   Diagnosis Date     Diabetes (H)      Diabetic retinopathy (H)      Hypertension        Past Social History:   Past Surgical History:   Procedure Laterality Date     EYE SURGERY       ORTHOPEDIC SURGERY       Personal history of bleeding or anesthesia problems: No    Family History:  No family history on file.    Personal History:   Social History     Socioeconomic History     Marital status: Single     Spouse name: Not on file     Number of children: Not on file     Years of education: Not on file     Highest education level: Not on file   Occupational History     Not on file   Tobacco Use     Smoking status: Every Day     Packs/day: 0.50     Types: Cigarettes     Smokeless tobacco: Never   Substance and Sexual Activity     Alcohol use: No     Drug use: No     Sexual activity: Not on file   Other Topics Concern     Not on file   Social History Narrative     Not on file     Social Determinants of Health     Financial Resource Strain: Not on file   Food Insecurity: Not on file   Transportation Needs: Not on file   Physical Activity: Not on file   Stress: Not on file   Social Connections: Not on file   Intimate Partner Violence: Not on file   Housing Stability: Not on file       Allergies:  No Known Allergies    Medications:  Current Outpatient Medications   Medication Sig     amLODIPine (NORVASC) 10 MG tablet Take 10 mg by mouth     BD NADYA U/F 32G X 4 MM insulin pen needle USE FOUR TIMES DAILY     blood glucose monitoring (ULTRA THIN 30G) lancets USE TO TEST FOUR TIMES DAILY AS DIRECTED.     carvedilol (COREG) 25 MG tablet Take 25 mg by mouth     Continuous Blood Gluc  (DEXCOM G6 ) VALENTIN FOR USE WITH CONTINUOUS GLUCOSE  "MONITORING SYSTEM.     Continuous Blood Gluc Sensor (DEXCOM G6 SENSOR) MISC CHANGE EVERY 10 DAYS     Continuous Blood Gluc Transmit (DEXCOM G6 TRANSMITTER) MISC CHANGE EVERY 3 MONTHS     insulin aspart (NOVOLOG FLEXPEN) 100 UNIT/ML soln Sliding scale dosage, as per your routine     Insulin Aspart (NOVOLOG SC)      insulin detemir (LEVEMIR FLEXTOUCH) 100 UNIT/ML pen Inject 50 Units Subcutaneous     insulin glargine (LANTUS SOLOSTAR) 100 UNIT/ML PEN 48 units at bedtime     HYDROcodone-acetaminophen (NORCO) 5-325 MG per tablet Take 1-2 tablets by mouth every 4 hours as needed for moderate to severe pain (Patient not taking: Reported on 1/18/2023)     insulin glargine (LANTUS VIAL) 100 UNIT/ML soln Inject Subcutaneous At Bedtime (Patient not taking: Reported on 1/18/2023)     LISINOPRIL PO  (Patient not taking: Reported on 1/18/2023)     No current facility-administered medications for this visit.       Vitals:  /80   Pulse 82   Ht 1.69 m (5' 6.54\")   Wt 90.3 kg (199 lb)   SpO2 98%   BMI 31.60 kg/m      Exam:  GENERAL APPEARANCE: alert and no distress  HENT: mouth without ulcers or lesions  LYMPHATICS: no cervical or supraclavicular nodes  RESP: lungs clear to auscultation - no rales, rhonchi or wheezes  CV: regular rhythm, normal rate, no rub, no murmur  EDEMA: no LE edema bilaterally  ABDOMEN: soft, nondistended, nontender, bowel sounds normal  MS: extremities normal - no gross deformities noted, no evidence of inflammation in joints, no muscle tenderness  SKIN: no rash    Results:   No results found for this or any previous visit (from the past 336 hour(s)).          Again, thank you for allowing me to participate in the care of your patient.        Sincerely,        ERI    "

## 2023-01-18 NOTE — PROGRESS NOTES
Transplant Surgery Consult Note    Medical record number: [id number]  YOB: 1990,   Consult requested by Dr. Roman for evaluation of kidney and pancreas transplant candidacy.    Assessment and Recommendations: Mr. Holland is a good candidate for transplantation and has a good understanding of the risks and benefits of this approach to management of renal failure and diabetes. The following issues should be addressed prior to transplant:     Mr. Holland has Chronic renal failure due to diabetes mellitus type 1 whose condition is not expected to resolve, is expected to progress, and is expected to continue to develop related comorbid conditions.  Cardiology consult for cardiac risk stratification to be ordered: Yes  CT abdomen and pelvis without contrast to be ordered for assessment of vascular targets: Yes  Transplant listing labs ordered to include HLA, ABOx2, Cr, etc.  Dietician consult ordered: Yes  Social work consult ordered: Yes  Imaging reports reviewed:  pending  Radiology images reviewed: pending  Recipient suitable to move forward with work up of living donors:  Yes  Suitable to move forward with kidney transplant.   Would list inactive for pancreas at this time. I am concerned about his insulin requirements which are quite elevated well above 1u/kg. He may have type 2 superimposed on type 1 diabetes and he may not come off insulin after pancreas transplant. Even on his current insulin needs, his hemoglobin A1c is elevated to 9.1%. I would like to see him lose weight to see if he could improve his insulin resistance and improve his hemoglobin A1c. Would not list active for pancreas at this time.  He does not seem to follow a diabetic diet so more education in a diabetic diet would be beneficial  Would not list for kidney active until hemoglobin A1c is below 8.5%. Would list inactive to start waiting time.  Smoking cessation to decrease risk of pancreas thrombosis    The majority of our visit  was spent in counselling, discussing the medical and surgical risks of living or  donor kidney and pancreas transplantation, either in a simultaneous or sequential fashion. I contrasted approximate wait time for SPK vs living vs  donor kidneys from normal (0-85%) or higher (%) kidney donor profile index (KDPI) donors and their associated outcomes. I would not recommend this individual to consider kidneys from high KDPI donors. The reason for this decision is best summarized as: not on dialysis yet.  Access to transplant will be impacted by living donor availability and overall candidacy for SPK, as well as the influence of blood type and degree of sensitization. We discussed advantages of preemptive transplant as well as living donor kidney transplant, and graft and patient survival outcomes associated with these options. Potential surgical complications of kidney and pancreas transplantation include bleeding, clotting, infection, wound complications, anastomotic failure and other issues such as cardiac complications, pneumonia, deep venous thrombosis, pulmonary embolism, post transplant diabetes and death. We discussed the need for protocol biopsy of the kidney and the possible need for a ureteral stent (and subsequent removal). We discussed benefits and risks associated with different approaches to exocrine drainage of pancreatic secretions. We also discussed differences in the average length of stay, recovery process, and posttransplant lab and monitoring protocol. We discussed the risk of graft rejection and recurrent diabetic nephropathy in the setting of poor glycemic control. I emphasized the need for strict immunosuppression adherence and the potential for complications of immunosuppression such as skin cancer or lymphoma, as well as a very low but not zero risk of donor-derived disease transmission risks (infection, cancer). Mr. Blountman asked good questions and the patient's candidacy  will be reviewed at our Multidisciplinary Selection Committee. Thank you for the opportunity to participate in Mr. Holland's care.    Total time: 60 minutes        Velma Fiore MD FACS  Assistant Professor of Surgery  Director, Living Kidney Donor Program.  ---------------------------------------------------------------------------------------------------    HPI: Mr. Holland has Chronic renal failure due to diabetes mellitus type 1. The patient has had diabetes for 19 years. Management is by Novolog 90 units throughout the day  55u levemir daily. The patient usually checks his blood sugar numerous times/day via CGM.  Daily blood glucoses range typically from 120 to 200.  Hypoglyemic unawareness is not an issue.  The diabetes is getting under control since getting CGM.    Complications of diabetes include:    Retinopathy:  Yes   Neuropathy: Yes   Gastroparesis:  No    The patient is not on dialysis.    Has potential kidney donors:  Yes .  Interested in participation in paired exchange if a donor is willing: Doesn't know     The patient has the following pertinent history:       No    Yes  Dialysis:    [x]      [] via:       Blood Transfusion                  [x]      []  Number of units:   Most recently:  Pregnancy:    [x]      [] Number:       Previous Transplant:  [x]      [] Details:    Cancer    [x]      [] Comment:   Kidney stones   [x]      [] Comment:      Recurrent infections  [x]      []  Type:                  Bladder dysfunction  [x]      [] Cause:    Claudication   [x]      [] Distance:    Previous Amputation  [x]      [] Cause:     Chronic anticoagulation  [x]      [] Indication:  Sabianist  [x]      []     Past Medical History:   Diagnosis Date     Diabetes (H)      Diabetic retinopathy (H)      Hypertension      Past Surgical History:   Procedure Laterality Date     EYE SURGERY       ORTHOPEDIC SURGERY       No family history on file.  Social History     Socioeconomic History      Marital status: Single     Spouse name: Not on file     Number of children: Not on file     Years of education: Not on file     Highest education level: Not on file   Occupational History     Not on file   Tobacco Use     Smoking status: Every Day     Packs/day: 0.50     Types: Cigarettes     Smokeless tobacco: Never   Substance and Sexual Activity     Alcohol use: No     Drug use: No     Sexual activity: Not on file   Other Topics Concern     Not on file   Social History Narrative     Not on file     Social Determinants of Health     Financial Resource Strain: Not on file   Food Insecurity: Not on file   Transportation Needs: Not on file   Physical Activity: Not on file   Stress: Not on file   Social Connections: Not on file   Intimate Partner Violence: Not on file   Housing Stability: Not on file       ROS:   CONSTITUTIONAL:  No fevers or chills  EYES: negative for icterus  ENT:  negative for hearing loss, tinnitus and sore throat  RESPIRATORY:  negative for cough, sputum, dyspnea  CARDIOVASCULAR:  negative for chest pain Fatigue  Renal Insufficiency  GASTROINTESTINAL:  negative for nausea, vomiting, diarrhea or constipation  GENITOURINARY:  negative for incontinence, dysuria, bladder emptying problems  HEME:  No easy bruising  INTEGUMENT:  negative for rash and pruritus  NEURO:  Negative for headache, seizure disorder    Allergies:   No Known Allergies    Medications:  Prescription Medications as of 1/18/2023       Rx Number Disp Refills Start End Last Dispensed Date Next Fill Date Owning Pharmacy    amLODIPine (NORVASC) 10 MG tablet    12/14/2021 3/24/2023       Sig: Take 10 mg by mouth    Class: Historical    Route: Oral    BD NADYA U/F 32G X 4 MM insulin pen needle    1/12/2023        Sig: USE FOUR TIMES DAILY    Class: Historical    blood glucose monitoring (ULTRA THIN 30G) lancets    10/27/2022        Sig: USE TO TEST FOUR TIMES DAILY AS DIRECTED.    Class: Historical    carvedilol (COREG) 25 MG tablet     2022       Sig: Take 25 mg by mouth    Class: Historical    Route: Oral    Continuous Blood Gluc  (DEXCOM G6 ) VALENTIN    2022        Sig: FOR USE WITH CONTINUOUS GLUCOSE MONITORING SYSTEM.    Class: Historical    Continuous Blood Gluc Sensor (DEXCOM G6 SENSOR) MISC    1/10/2023        Sig: CHANGE EVERY 10 DAYS    Class: Historical    Continuous Blood Gluc Transmit (DEXCOM G6 TRANSMITTER) MISC    2023        Sig: CHANGE EVERY 3 MONTHS    Class: Historical    HYDROcodone-acetaminophen (NORCO) 5-325 MG per tablet  15 tablet 0 2014        Sig: Take 1-2 tablets by mouth every 4 hours as needed for moderate to severe pain    Route: Oral    insulin aspart (NOVOLOG FLEXPEN) 100 UNIT/ML soln  1 Month 0 2016        Sig: Sliding scale dosage, as per your routine    Class: Local Print    Insulin Aspart (NOVOLOG SC)            Class: Historical    Route: Subcutaneous    insulin detemir (LEVEMIR FLEXTOUCH) 100 UNIT/ML pen    10/20/2022        Sig: Inject 50 Units Subcutaneous    Class: Historical    Route: Subcutaneous    insulin glargine (LANTUS SOLOSTAR) 100 UNIT/ML PEN  1 Month 0 2016        Si units at bedtime    Class: Local Print    insulin glargine (LANTUS VIAL) 100 UNIT/ML soln            Sig: Inject Subcutaneous At Bedtime    Class: Historical    Route: Subcutaneous    LISINOPRIL PO            Class: Historical    Route: Oral          Exam:   Pulse:  [82] 82  BP: (137)/(80) 137/80  SpO2:  [98 %] 98 %  Appearance: in no apparent distress.   Skin: normal  Head and Neck: Normal, no rashes or jaundice  Respiratory: easy respirations, no audible wheezing.  Cardiovascular: RRR  Abdomen: rounded, obese and protuberant, No distention and No surgical scars   Extremities: femoral difficult to palpate due to body habitus bilaterally, Edema, none  Neuro: without deficit     Diagnostics:   No results found for this or any previous visit (from the past 672 hour(s)).  No results  found for: CPRA

## 2023-01-18 NOTE — PROGRESS NOTES
Kidney, Pancreas Transplant Evaluation - 1/18/2023  Sugar Holland attended the pre-transplant patient EVALUATION 1/18/2023 with his mother Peyton.  The [facility] website pre-transplant modules were NOT viewed; I instructed both patient and mom to watch the videos in [facility].   Content reviewed:    Living Donation and how to access that program: donor cards give with process    Paired exchange    Kidney Donor Profile Index (KDPI)PT signed NOT Willing to accept >85%    Waiting list issues (right to decline without penalty, high PHS risk donors, what to expect when called with an offer)    Hospital experience,  length of stay , need to stay locally post-discharge (2-4 weeks)    Surgical options (with pictures)                             Post-surgery lifting and driving restrictions    Post-transplant routines, frequency of lab work and clinic visits    Need to stay locally post-discharge (2-4 weeks)    Role of Transplant Coordinator    Participants were informed of the benefits of transplant as well as potential risks such as infection, cancer, and death.  The need for total adherence with immunosuppression medications and following transplant regimens was stressed.  The overall evaluation/approval/listing process was reviewed.        The patient was provided with the following documents:  What You Need to Know About a Kidney Transplant  Adult Kidney Transplant - A Guide for Patients  SRTR Data Sheet - Kidney  Brochure - Kidney Allocation  What You Need to Know About a Pancreas Transplant  Adult Pancreas Transplant-A Guide for Patients  SRTR Data Sheet - Pancreas  Brochure - Pancreas  SRTR Data Sheet - Kidney/Pancreas  Brochure - SPK  Brochure - Multiple Listing and Waiting Time Transfer  What Every Patient Needs to Know (UNOS)  UNOS Facts and Figures  Finding a Donor  [facility] - Quick Start Guide  KDPI Consent  Receipt of Information form    Sugar BHATT Holland signed the  Receipt of  "Information for Organ Transplant Recipient.\" He was provided Darcy Nataly's business card and instructed to call with additional questions.      Summary    Team s concerns/comments: Pt has CKD  d/t diabetic nephropathy. Pt is not yet on dialysis and follows w/ Dr. Roman.  Pt is a type 1 diabetic, diagnosed at age 13.  Other hx includes HTN.  BMI 31.  Dental: not UTD.  Pt is a former smoker -   Candidacy category: No data was found    Action/Plan:  - Donor okay to register to [email or web address]  - Labs, CXR, EKG and Echo to be done today in [facility]  -Ramell to register to [facility] and watch all videos episodes in pre transplant, watch the post transplant K and pancreas complications section   -Pt may need PFT's  -Pt to see cardiology for baseline assessment   -refer to team for further testing.     Expected Selection Meeting Discussion: 1/25/2023      "

## 2023-01-18 NOTE — PROGRESS NOTES
Transplant Surgery Consult Note    Medical record number: [id number]  YOB: 1990,   Consult requested by  *** for evaluation of {ORGAN:713671} transplant candidacy.    Assessment and Recommendations: Mr. Holland is a {EXCELLENT. GOOD. FAIR, POOR:090134} candidate for transplantation and has a *** understanding of the risks and benefits of this approach to management of renal failure and diabetes. The following issues should be addressed prior to transplant:     Mr. Holland has {[facility] TRANSPLANT DX:113300} whose condition is not expected to resolve, is expected to progress, and is expected to continue to develop related comorbid conditions.  Cardiology consult for cardiac risk stratification to be ordered: {Yes and No:524667}  CT abdomen and pelvis without contrast to be ordered for assessment of vascular targets: {Yes and No:499361}  Transplant listing labs ordered to include HLA, ABOx2, Cr, etc.  Dietician consult ordered: Yes  Social work consult ordered: Yes  Imaging reports reviewed:  ***  Radiology images reviewed:***  Recipient suitable to move forward with work up of living donors:  {Yes and No:345832}  ***    The majority of our visit was spent in counselling, discussing the medical and surgical risks of living or  donor kidney and pancreas transplantation, either in a simultaneous or sequential fashion. I contrasted approximate wait time for SPK vs living vs  donor kidneys from normal (0-85%) or higher (%) kidney donor profile index (KDPI) donors and their associated outcomes. I {Would:884713} recommend this individual to consider kidneys from high KDPI donors. The reason for this decision is best summarized as: {KDPI REASON:433949943}.  Access to transplant will be impacted by living donor availability and overall candidacy for SPK, as well as the influence of blood type and degree of sensitization. We discussed advantages of preemptive transplant as well as living donor  "kidney transplant, and graft and patient survival outcomes associated with these options. Potential surgical complications of kidney and pancreas transplantation include bleeding, clotting, infection, wound complications, anastomotic failure and other issues such as cardiac complications, pneumonia, deep venous thrombosis, pulmonary embolism, post transplant diabetes and death. We discussed the need for protocol biopsy of the kidney and the possible need for a ureteral stent (and subsequent removal). We discussed benefits and risks associated with different approaches to exocrine drainage of pancreatic secretions. We also discussed differences in the average length of stay, recovery process, and posttransplant lab and monitoring protocol. We discussed the risk of graft rejection and recurrent diabetic nephropathy in the setting of poor glycemic control. I emphasized the need for strict immunosuppression adherence and the potential for complications of immunosuppression such as skin cancer or lymphoma, as well as a very low but not zero risk of donor-derived disease transmission risks (infection, cancer). Mr. Holland asked good questions and the patient's candidacy will be reviewed at our Multidisciplinary Selection Committee. Thank you for the opportunity to participate in Mr. Holland's care.    Total time: *** minutes  Counselling time: *** minutes    {[facility] TRANSPLANT MD SIGNATURE:[id number]}  ---------------------------------------------------------------------------------------------------    HPI: Mr. Holland has {[facility] TRANSPLANT DX:903362}. The patient has had diabetes for *** years. Management is by {INSULINS:495995}. The patient usually checks {his / her:227213} blood sugar {NUMBER 1-3:252069} times/day.  Daily blood glucoses range typically from *** to ***.  Hypoglyemic unawareness {IS/IS NOT:9024} an issue***.  The diabetes is {controlled/uncontrolled:110165::\"controlled\"}.    Complications of diabetes include: " "   Retinopathy:  {Yes/No:81182620::\"No\"}  Neuropathy: {Yes/No:81493622::\"No\"}  Gastroparesis:  {Yes/No:49264948::\"No\"}    The patient {is/is not:662712::\"is\"} on dialysis.    Has potential kidney donors:  {Yes/No:91316231::\"No\"}.  Interested in participation in paired exchange if a donor is willing: {Yes/No Doesn't know ( Default Yes):45031144::\"Yes \"}    The patient has the following pertinent history:       No    Yes  Dialysis:    []      [] via:       Blood Transfusion                  []      []  Number of units:   Most recently:  Pregnancy:    []      [] Number:       Previous Transplant:  []      [] Details:    Cancer    []      [] Comment:   Kidney stones   []      [] Comment:      Recurrent infections  []      []  Type:                  Bladder dysfunction  []      [] Cause:    Claudication   []      [] Distance:    Previous Amputation  []      [] Cause:     Chronic anticoagulation  []      [] Indication:  Lutheran  []      []     Past Medical History:   Diagnosis Date     Diabetes (H)      Diabetic retinopathy (H)      Hypertension      Past Surgical History:   Procedure Laterality Date     EYE SURGERY       ORTHOPEDIC SURGERY       No family history on file.  Social History     Socioeconomic History     Marital status: Single     Spouse name: Not on file     Number of children: Not on file     Years of education: Not on file     Highest education level: Not on file   Occupational History     Not on file   Tobacco Use     Smoking status: Every Day     Packs/day: 0.50     Types: Cigarettes     Smokeless tobacco: Never   Substance and Sexual Activity     Alcohol use: No     Drug use: No     Sexual activity: Not on file   Other Topics Concern     Not on file   Social History Narrative     Not on file     Social Determinants of Health     Financial Resource Strain: Not on file   Food Insecurity: Not on file   Transportation Needs: Not on file   Physical Activity: Not on file   Stress: Not on file "   Social Connections: Not on file   Intimate Partner Violence: Not on file   Housing Stability: Not on file       ROS:   CONSTITUTIONAL:  No fevers or chills  EYES: negative for icterus  ENT:  negative for hearing loss, tinnitus and sore throat  RESPIRATORY:  negative for cough, sputum, dyspnea  CARDIOVASCULAR:  negative for chest pain {Vascular Disease Manifestation:895166}  GASTROINTESTINAL:  negative for nausea, vomiting, diarrhea or constipation  GENITOURINARY:  negative for incontinence, dysuria, bladder emptying problems  HEME:  No easy bruising  INTEGUMENT:  negative for rash and pruritus  NEURO:  Negative for headache, seizure disorder    Allergies:   No Known Allergies    Medications:  Prescription Medications as of 1/18/2023       Rx Number Disp Refills Start End Last Dispensed Date Next Fill Date Owning Pharmacy    amLODIPine (NORVASC) 10 MG tablet    12/14/2021 3/24/2023       Sig: Take 10 mg by mouth    Class: Historical    Route: Oral    BD NADYA U/F 32G X 4 MM insulin pen needle    1/12/2023        Sig: USE FOUR TIMES DAILY    Class: Historical    blood glucose monitoring (ULTRA THIN 30G) lancets    10/27/2022        Sig: USE TO TEST FOUR TIMES DAILY AS DIRECTED.    Class: Historical    carvedilol (COREG) 25 MG tablet    9/27/2022 9/27/2023       Sig: Take 25 mg by mouth    Class: Historical    Route: Oral    Continuous Blood Gluc  (DEXCOM G6 ) VALENTIN    11/9/2022        Sig: FOR USE WITH CONTINUOUS GLUCOSE MONITORING SYSTEM.    Class: Historical    Continuous Blood Gluc Sensor (DEXCOM G6 SENSOR) MISC    1/10/2023        Sig: CHANGE EVERY 10 DAYS    Class: Historical    Continuous Blood Gluc Transmit (DEXCOM G6 TRANSMITTER) MISC    1/12/2023        Sig: CHANGE EVERY 3 MONTHS    Class: Historical    HYDROcodone-acetaminophen (NORCO) 5-325 MG per tablet  15 tablet 0 6/9/2014        Sig: Take 1-2 tablets by mouth every 4 hours as needed for moderate to severe pain    Route: Oral    insulin  "aspart (NOVOLOG FLEXPEN) 100 UNIT/ML soln  1 Month 0 2016        Sig: Sliding scale dosage, as per your routine    Class: Local Print    Insulin Aspart (NOVOLOG SC)            Class: Historical    Route: Subcutaneous    insulin detemir (LEVEMIR FLEXTOUCH) 100 UNIT/ML pen    10/20/2022        Sig: Inject 50 Units Subcutaneous    Class: Historical    Route: Subcutaneous    insulin glargine (LANTUS SOLOSTAR) 100 UNIT/ML PEN  1 Month 0 2016        Si units at bedtime    Class: Local Print    insulin glargine (LANTUS VIAL) 100 UNIT/ML soln            Sig: Inject Subcutaneous At Bedtime    Class: Historical    Route: Subcutaneous    LISINOPRIL PO            Class: Historical    Route: Oral          Exam:   Pulse:  [82] 82  BP: (137)/(80) 137/80  SpO2:  [98 %] 98 %  Appearance: {Distress levels:927841::\"in no apparent distress.\"}   Skin: {SKIN:574634}  Head and Neck: {JAUNDICE:779068:s}  Respiratory: easy respirations, no audible wheezing.  Cardiovascular: RRR  Abdomen: {ABDOMEN INSPECTION:602}, {ABDOMEN (MM):896827}   Extremities: {PULSE POSITIVES LIST:988918:s}, Edema, {EDEMA1:726187}  Neuro: {NEURO:277804}     Diagnostics:   No results found for this or any previous visit (from the past 672 hour(s)).  No results found for: CPRA  "

## 2023-01-18 NOTE — LETTER
Date:January 26, 2023      Patient was self referred, no letter generated. Do not send.        [facility] Information

## 2023-01-18 NOTE — LETTER
Date:February 7, 2023      Patient was self referred, no letter generated. Do not send.        [facility] Information

## 2023-01-18 NOTE — LETTER
1/18/2023         RE: Sugar Holland  [address]  [facility] [address]        Dear Colleague,    Thank you for referring your patient, Sugar Holland, to the [facility]. Please see a copy of my visit note below.    Transplant Surgery Consult Note    Medical record number: [id number]  YOB: 1990,   Consult requested by Dr. Roman for evaluation of kidney and pancreas transplant candidacy.    Assessment and Recommendations: Mr. Holland is a good candidate for transplantation and has a good understanding of the risks and benefits of this approach to management of renal failure and diabetes. The following issues should be addressed prior to transplant:     Mr. Holland has Chronic renal failure due to diabetes mellitus type 1 whose condition is not expected to resolve, is expected to progress, and is expected to continue to develop related comorbid conditions.  Cardiology consult for cardiac risk stratification to be ordered: Yes  CT abdomen and pelvis without contrast to be ordered for assessment of vascular targets: Yes  Transplant listing labs ordered to include HLA, ABOx2, Cr, etc.  Dietician consult ordered: Yes  Social work consult ordered: Yes  Imaging reports reviewed:  pending  Radiology images reviewed: pending  Recipient suitable to move forward with work up of living donors:  Yes  Suitable to move forward with kidney transplant.   Would list inactive for pancreas at this time. I am concerned about his insulin requirements which are quite elevated well above 1u/kg. He may have type 2 superimposed on type 1 diabetes and he may not come off insulin after pancreas transplant. Even on his current insulin needs, his hemoglobin A1c is elevated to 9.1%. I would like to see him lose weight to see if he could improve his insulin resistance and improve his hemoglobin A1c. Would not list active for pancreas at this time.  He does not seem to follow a diabetic diet  so more education in a diabetic diet would be beneficial  Would not list for kidney active until hemoglobin A1c is below 8.5%. Would list inactive to start waiting time.  Smoking cessation to decrease risk of pancreas thrombosis    The majority of our visit was spent in counselling, discussing the medical and surgical risks of living or  donor kidney and pancreas transplantation, either in a simultaneous or sequential fashion. I contrasted approximate wait time for SPK vs living vs  donor kidneys from normal (0-85%) or higher (%) kidney donor profile index (KDPI) donors and their associated outcomes. I would not recommend this individual to consider kidneys from high KDPI donors. The reason for this decision is best summarized as: not on dialysis yet.  Access to transplant will be impacted by living donor availability and overall candidacy for SPK, as well as the influence of blood type and degree of sensitization. We discussed advantages of preemptive transplant as well as living donor kidney transplant, and graft and patient survival outcomes associated with these options. Potential surgical complications of kidney and pancreas transplantation include bleeding, clotting, infection, wound complications, anastomotic failure and other issues such as cardiac complications, pneumonia, deep venous thrombosis, pulmonary embolism, post transplant diabetes and death. We discussed the need for protocol biopsy of the kidney and the possible need for a ureteral stent (and subsequent removal). We discussed benefits and risks associated with different approaches to exocrine drainage of pancreatic secretions. We also discussed differences in the average length of stay, recovery process, and posttransplant lab and monitoring protocol. We discussed the risk of graft rejection and recurrent diabetic nephropathy in the setting of poor glycemic control. I emphasized the need for strict immunosuppression  adherence and the potential for complications of immunosuppression such as skin cancer or lymphoma, as well as a very low but not zero risk of donor-derived disease transmission risks (infection, cancer). Mr. Holland asked good questions and the patient's candidacy will be reviewed at our Multidisciplinary Selection Committee. Thank you for the opportunity to participate in Mr. Holland's care.    Total time: 60 minutes        Velma Fiore MD FACS  Assistant Professor of Surgery  Director, Living Kidney Donor Program.  ---------------------------------------------------------------------------------------------------    HPI: Mr. Holland has Chronic renal failure due to diabetes mellitus type 1. The patient has had diabetes for 19 years. Management is by Novolog 90 units throughout the day  55u levemir daily. The patient usually checks his blood sugar numerous times/day via CGM.  Daily blood glucoses range typically from 120 to 200.  Hypoglyemic unawareness is not an issue.  The diabetes is getting under control since getting CGM.    Complications of diabetes include:    Retinopathy:  Yes   Neuropathy: Yes   Gastroparesis:  No    The patient is not on dialysis.    Has potential kidney donors:  Yes .  Interested in participation in paired exchange if a donor is willing: Doesn't know     The patient has the following pertinent history:       No    Yes  Dialysis:    [x]      [] via:       Blood Transfusion                  [x]      []  Number of units:   Most recently:  Pregnancy:    [x]      [] Number:       Previous Transplant:  [x]      [] Details:    Cancer    [x]      [] Comment:   Kidney stones   [x]      [] Comment:      Recurrent infections  [x]      []  Type:                  Bladder dysfunction  [x]      [] Cause:    Claudication   [x]      [] Distance:    Previous Amputation  [x]      [] Cause:     Chronic anticoagulation  [x]      [] Indication:  Zoroastrian  [x]      []     Past Medical  History:   Diagnosis Date     Diabetes (H)      Diabetic retinopathy (H)      Hypertension      Past Surgical History:   Procedure Laterality Date     EYE SURGERY       ORTHOPEDIC SURGERY       No family history on file.  Social History     Socioeconomic History     Marital status: Single     Spouse name: Not on file     Number of children: Not on file     Years of education: Not on file     Highest education level: Not on file   Occupational History     Not on file   Tobacco Use     Smoking status: Every Day     Packs/day: 0.50     Types: Cigarettes     Smokeless tobacco: Never   Substance and Sexual Activity     Alcohol use: No     Drug use: No     Sexual activity: Not on file   Other Topics Concern     Not on file   Social History Narrative     Not on file     Social Determinants of Health     Financial Resource Strain: Not on file   Food Insecurity: Not on file   Transportation Needs: Not on file   Physical Activity: Not on file   Stress: Not on file   Social Connections: Not on file   Intimate Partner Violence: Not on file   Housing Stability: Not on file       ROS:   CONSTITUTIONAL:  No fevers or chills  EYES: negative for icterus  ENT:  negative for hearing loss, tinnitus and sore throat  RESPIRATORY:  negative for cough, sputum, dyspnea  CARDIOVASCULAR:  negative for chest pain Fatigue  Renal Insufficiency  GASTROINTESTINAL:  negative for nausea, vomiting, diarrhea or constipation  GENITOURINARY:  negative for incontinence, dysuria, bladder emptying problems  HEME:  No easy bruising  INTEGUMENT:  negative for rash and pruritus  NEURO:  Negative for headache, seizure disorder    Allergies:   No Known Allergies    Medications:  Prescription Medications as of 1/18/2023       Rx Number Disp Refills Start End Last Dispensed Date Next Fill Date Owning Pharmacy    amLODIPine (NORVASC) 10 MG tablet    12/14/2021 3/24/2023       Sig: Take 10 mg by mouth    Class: Historical    Route: Oral    BD NADYA U/F 32G X 4 MM  insulin pen needle    2023        Sig: USE FOUR TIMES DAILY    Class: Historical    blood glucose monitoring (ULTRA THIN 30G) lancets    10/27/2022        Sig: USE TO TEST FOUR TIMES DAILY AS DIRECTED.    Class: Historical    carvedilol (COREG) 25 MG tablet    2022       Sig: Take 25 mg by mouth    Class: Historical    Route: Oral    Continuous Blood Gluc  (DEXCOM G6 ) VALENTIN    2022        Sig: FOR USE WITH CONTINUOUS GLUCOSE MONITORING SYSTEM.    Class: Historical    Continuous Blood Gluc Sensor (DEXCOM G6 SENSOR) MISC    1/10/2023        Sig: CHANGE EVERY 10 DAYS    Class: Historical    Continuous Blood Gluc Transmit (DEXCOM G6 TRANSMITTER) MISC    2023        Sig: CHANGE EVERY 3 MONTHS    Class: Historical    HYDROcodone-acetaminophen (NORCO) 5-325 MG per tablet  15 tablet 0 2014        Sig: Take 1-2 tablets by mouth every 4 hours as needed for moderate to severe pain    Route: Oral    insulin aspart (NOVOLOG FLEXPEN) 100 UNIT/ML soln  1 Month 0 2016        Sig: Sliding scale dosage, as per your routine    Class: Local Print    Insulin Aspart (NOVOLOG SC)            Class: Historical    Route: Subcutaneous    insulin detemir (LEVEMIR FLEXTOUCH) 100 UNIT/ML pen    10/20/2022        Sig: Inject 50 Units Subcutaneous    Class: Historical    Route: Subcutaneous    insulin glargine (LANTUS SOLOSTAR) 100 UNIT/ML PEN  1 Month 0 2016        Si units at bedtime    Class: Local Print    insulin glargine (LANTUS VIAL) 100 UNIT/ML soln            Sig: Inject Subcutaneous At Bedtime    Class: Historical    Route: Subcutaneous    LISINOPRIL PO            Class: Historical    Route: Oral          Exam:   Pulse:  [82] 82  BP: (137)/(80) 137/80  SpO2:  [98 %] 98 %  Appearance: in no apparent distress.   Skin: normal  Head and Neck: Normal, no rashes or jaundice  Respiratory: easy respirations, no audible wheezing.  Cardiovascular: RRR  Abdomen: rounded, obese and  protuberant, No distention and No surgical scars   Extremities: femoral difficult to palpate due to body habitus bilaterally, Edema, none  Neuro: without deficit     Diagnostics:   No results found for this or any previous visit (from the past 672 hour(s)).  No results found for: CPRA      Again, thank you for allowing me to participate in the care of your patient.        Sincerely,        ERI

## 2023-01-19 LAB
ATRIAL RATE - MUSE: 72 BPM
C PEPTIDE SERPL-MCNC: <0.1 NG/ML (ref 0.9–6.9)
CMV IGG SERPL IA-ACNC: 3.3 U/ML
CMV IGG SERPL IA-ACNC: ABNORMAL
DIASTOLIC BLOOD PRESSURE - MUSE: NORMAL MMHG
DRVVT SCREEN RATIO: 1.01
EBV VCA IGG SER IA-ACNC: 91.8 U/ML
EBV VCA IGG SER IA-ACNC: POSITIVE
HBV SURFACE AB SERPL IA-ACNC: 13.87 M[IU]/ML
HBV SURFACE AB SERPL IA-ACNC: REACTIVE M[IU]/ML
HBV SURFACE AG SERPL QL IA: NONREACTIVE
HIV 1+2 AB+HIV1 P24 AG SERPL QL IA: NONREACTIVE
INTERPRETATION ECG - MUSE: NORMAL
LA PPP-IMP: NEGATIVE
LUPUS INTERPRETATION: NORMAL
P AXIS - MUSE: 38 DEGREES
PR INTERVAL - MUSE: 188 MS
PTT RATIO: 0.97
QRS DURATION - MUSE: 78 MS
QT - MUSE: 380 MS
QTC - MUSE: 416 MS
R AXIS - MUSE: 10 DEGREES
SYSTOLIC BLOOD PRESSURE - MUSE: NORMAL MMHG
T AXIS - MUSE: 3 DEGREES
THROMBIN TIME: 19.1 SECONDS (ref 13–19)
VENTRICULAR RATE- MUSE: 72 BPM
VZV IGG SER QL IA: 2159 INDEX
VZV IGG SER QL IA: POSITIVE

## 2023-01-20 LAB
CARDIOLIPIN IGG SER IA-ACNC: <2 GPL-U/ML
CARDIOLIPIN IGG SER IA-ACNC: NEGATIVE
CARDIOLIPIN IGM SER IA-ACNC: <2 MPL-U/ML
CARDIOLIPIN IGM SER IA-ACNC: NEGATIVE
GAMMA INTERFERON BACKGROUND BLD IA-ACNC: 0.03 IU/ML
M TB IFN-G BLD-IMP: NEGATIVE
M TB IFN-G CD4+ BCKGRND COR BLD-ACNC: 9.97 IU/ML
MITOGEN IGNF BCKGRD COR BLD-ACNC: -0.01 IU/ML
MITOGEN IGNF BCKGRD COR BLD-ACNC: 0 IU/ML
QUANTIFERON MITOGEN: 10 IU/ML
QUANTIFERON NIL TUBE: 0.03 IU/ML
QUANTIFERON TB1 TUBE: 0.03 IU/ML
QUANTIFERON TB2 TUBE: 0.02

## 2023-01-25 ENCOUNTER — COMMITTEE REVIEW (OUTPATIENT)
Dept: TRANSPLANT | Facility: CLINIC | Age: 33
End: 2023-01-25
Payer: COMMERCIAL

## 2023-01-25 PROBLEM — I15.0 RENOVASCULAR HYPERTENSION: Status: ACTIVE | Noted: 2023-01-25

## 2023-01-25 PROBLEM — N18.4 CKD (CHRONIC KIDNEY DISEASE) STAGE 4, GFR 15-29 ML/MIN (H): Status: ACTIVE | Noted: 2023-01-25

## 2023-01-25 PROBLEM — E10.21 TYPE 1 DIABETES MELLITUS WITH NEPHROPATHY (H): Status: ACTIVE | Noted: 2023-01-25

## 2023-01-25 PROBLEM — E11.9 DIABETES MELLITUS, TYPE 2 (H): Status: ACTIVE | Noted: 2023-01-25

## 2023-01-25 PROBLEM — Z87.891 HISTORY OF TOBACCO USE: Status: ACTIVE | Noted: 2023-01-25

## 2023-01-25 LAB
A*: NORMAL
A*LOCUS NMDP: NORMAL
A*LOCUS SEROLOGIC EQUIVALENT: 23
A*LOCUS: NORMAL
A*NMDP: NORMAL
A*SEROLOGIC EQUIVALENT: 24
ABTEST METHOD: NORMAL
B*: NORMAL
B*LOCUS SEROLOGIC EQUIVALENT: 39
B*LOCUS: NORMAL
B*SEROLOGIC EQUIVALENT: 58
BW-1: NORMAL
BW-2: NORMAL
C*: NORMAL
C*LOCUS SEROLOGIC EQUIVALENT: 7
C*LOCUS: NORMAL
C*SEROLOGIC EQUIVALENT: 7
DPA1*: NORMAL
DPA1*LOCUS: NORMAL
DPB1*: NORMAL
DPB1*LOCUS: NORMAL
DQA1*: NORMAL
DQA1*LOCUS: NORMAL
DQB1*: NORMAL
DQB1*LOCUS SEROLOGIC EQUIVALENT: 2
DQB1*LOCUS: NORMAL
DQB1*SEROLOGIC EQUIVALENT: 4
DRB1*: NORMAL
DRB1*LOCUS SEROLOGIC EQUIVALENT: 7
DRB1*LOCUS: NORMAL
DRB1*SEROLOGIC EQUIVALENT: 8
DRB4*LOCUS SEROLOGIC EQUIVALENT: 53
DRB4*LOCUS: NORMAL
DRSSO TEST METHOD: NORMAL
PROTOCOL CUTOFF: NORMAL
SA 1 CELL: NORMAL
SA 1 TEST METHOD: NORMAL
SA 2 CELL: NORMAL
SA 2 TEST METHOD: NORMAL
SA1 HI RISK ABY: NORMAL
SA1 MOD RISK ABY: NORMAL
SA2 HI RISK ABY: NORMAL
SA2 MOD RISK ABY: NORMAL
UNACCEPTABLE ANTIGENS: NORMAL
UNOS CPRA: 56
ZZZSA 1  COMMENTS: NORMAL
ZZZSA 2 COMMENTS: NORMAL

## 2023-01-25 NOTE — COMMITTEE REVIEW
Abdominal Committee Review Note     Evaluation Date: 1/18/2023  Committee Review Date: 1/25/2023    Organ being evaluated for: Kidney/Pancreas    Transplant Phase: Evaluation  Transplant Status: Active    Transplant Coordinator: Darcy Ingram  Transplant Surgeon: Dr. Velma Fiore    Referring Physician: Dr. William Roman    Primary Diagnosis: Diabetes Mellitus - Type I  Secondary Diagnosis: CKD    Committee Review Members:  Deven, Artie Frias RD   Nephrology Aakash Joel, APRN CNP, Andrez Bacon MD   Pharmacist Yadi Plunkett, [facility]    - Clinical Sanjanagil Wise, [facility]   Transplant Sophie Allison, RN, Darcy Ingram, RN, Brandon Estrada MD, Katie Berger, RN, Milady Bermeo, RN, Veronique Frazier, RN, Jackelyn Arroyo, RN       Transplant Eligibility: Insulin-dependent diabetes mellitus, Irreversible chronic kidney disease treated w/dialysis or expected need for dialysis    Committee Review Decision: Approved    Relative Contraindications: None    Absolute Contraindications: None    Committee Chair Brandon Estrada MD verbally attested to the committee's decision.    Committee Discussion Details: Reviewed pt's medical status and evaluation results to date with multidisciplinary committee.    Recommended the following evaluation items:    Cardiology: pt will need cardiac risk assessment w/ stress test (pt has 19 years of diabetes, Committee is not yet requiring coronary angiogram)  Social Work: Continue to reinforce compliance and post-transplant routine, pt will should follow up with SW in 6 months to touch base, he should work on lowering his A1c to less than 8  Imaging Recommended:  Ab/Pelv CT (iliac US not indicated at this time)  Health Maintenance:  Work on weight loss although BMI acceptable at this time, will need dental clearance    Patient should have live donors register now to initiate donor evaluation: Yes    Committee  determined that patient is a Good candidate for Kidney Pancreas    Listing plan: List Inactive     Patient will be called and summary letter will be sent.

## 2023-01-26 ENCOUNTER — TELEPHONE (OUTPATIENT)
Dept: TRANSPLANT | Facility: CLINIC | Age: 33
End: 2023-01-26
Payer: COMMERCIAL

## 2023-01-26 NOTE — TELEPHONE ENCOUNTER
Called pt to discuss outcome of Selection Committee, left VM requesting call back to direct line.

## 2023-01-26 NOTE — LETTER
01/26/23      Sugar Holland  [address]  [facility] [address]      Dear Sugar,    It was a pleasure to see you recently for consideration of kidney and pancreas transplantation. Your pre-transplant evaluation results were reviewed at our Multidisciplinary Selection Committee on 1/25/23. The Committee has approved you as a candidate for kidney and pancreas transplant.  You will be placed on the kidney and pancreas wait list- INACTIVE status, meaning you will accrue wait time, but will not be eligible for organ offers until your evaluation is completed and approved by the Selection Committee.  The Committee is requesting the following items are completed as part of your evaluation    1. Cardia risk assessment - our schedulers will call you to arrange this visit     2. Dental clearance - please have all recommended dental work completed and contact our office when complete    3. Please work to lower your A1c to 8 or less    4. We recommend working on weight loss to help decrease your insulin needs    5. Abdominal/Pelvic CT - our schedulers will call you to arrange this visit    6. Please ensure all age appropriate vaccinations are up to date - you can work on this with your Primary Care Provider    7. We will have you touch base with Social Work in 6 months                              Once the above items are completed, your case will be reviewed by the Committee to be considered for active listing status on the kidney and pancreas transplant wait list. For any questions, please contact the Transplant Office at [phone].      Sincerely,  Darcy Ingram RN, Pre-Kidney/Pancreas Transplant Coordinator   Solid Organ Transplant  [facility], [facility]    CC: Dr. William Roman, Constantin Golden PA-C

## 2023-01-26 NOTE — LETTER
2023    Sugar MILLER Holland  [address]  [facility] [address]      Dear Mr. Holland,    This letter is sent to confirm that you are a candidate in the kidney and pancreas transplant program at the [facility].  You were placed on the kidney and pancreas inactive waitlist on 23.  This means you will accumulate waiting time but not receive  donor calls.       Items we will need from you:      We have received approval from you insurance company for the transplant procedure.  It is critical that you notify us if there is any change in your insurance.  It is also important that you familiarize yourself with the details of your specific insurance policy.  Our patient  is available to assist you if you should have any questions regarding your coverage.      Once you are listed active status, an ALA or PRA blood sample will need to be sent here every 3 months to match you with  donors or any potential living donors.  At that time, special mailing boxes (called mailers) will be sent to you directly from the Outreach Department.  You should take the physician order form and the  to your home laboratory when it is time to for this testing to be done.  Additional mailers can be obtained by calling the Transplant Office and asking to speak to a kidney and pancreas .      During this waiting period, we may request additional periodic laboratory tests with your primary physician.  It will be your responsibility to remind your physician to forward your results to the Transplant Office.      We need to be kept informed of any changes in your medical condition such as:    o changes in your medications,   o significant changes in your health  o significant infections (such as pneumonia or abscesses)  o blood transfusions  o any condition which requires hospitalization  o any  surgery              Remember to complete any routine cancer screening tests required before your transplant.  This includes colonoscopy; prostrate screening for men, and mammogram and gynecologic testing for women, as well as dental work.  Your primary care clinic can assist you with arranging for these exams.  Remind your caregivers to forward copies of the records and final reports.    We want you to know that our program has physician and surgeon coverage 24 hours a day, 365 days a year. In addition, our transplant surgeons and physicians will not be on call for two or more transplant programs more than 30 miles apart unless the circumstances have been reviewed and approved by the [employer] for Organ Sharing (UNOS) Membership and Professional Standards Committee (MPSC). Finally, our primary physician and primary surgeons are not designated as the primary surgeon or primary physician at more than 1 transplant hospital. If this coverage changes or there are substantial program changes, you will be notified in writing by letter.     Attached is a letter from UNOS that describes the services and information offered to patients by UNOS and the Organ Procurement and Transplantation Network (OPTN).    We appreciate having had the opportunity to participate in your care.  If you have questions, please feel free to call the Transplant Office at [phone] or [phone].  Per our normal office work flow, your new wait list coordinator will be Sophie Allison RN, with the assistance of Mignon Mckeon LPN.  Sophie can be reached at [phone].    Sincerely,   Darcy Ingram RN, Pre-Kidney/Pancreas Transplant Coordinator  Kidney and Pancreas Transplant Program    Enclosures: OS Letter  CC:   Dr. Skyla Zhang, Dr. William Roman, Constantin Scanlon PA-C                            The Organ Procurement and Transplantation Network  Toll-free patient services line:     Your resource for organ transplant information    If  you have a question regarding your own medical care, you always should call your transplant hospital first. However, for general organ transplant-related information, you can call the Organ Procurement and Transplantation Network (OPTN) toll-free patient services line at [phone]. Anyone, including potential transplant candidates, candidates, recipients, family members, friends, living donors, and donor family members, can call this number to:          Talk about organ donation, living donation, the transplant process, the donation process, and transplant policies.    Get a free patient information kit with helpful booklets, waiting list and transplant information, and a list of all transplant hospitals.    Ask questions about the OPTN website (https://optn.transplant.hrsa.gov/), the United Network for Organ Sharing s (UNOS) website (https://unos.org/), or the UNOS website for living donors and transplant recipients. (https://www.transplantliving.org/).    Learn how the OPTN can help you.    Talk about any concerns that you may have with a transplant hospital.    The [facility] transplant system, the OPTN, is managed under federal contract by the United Network for Organ Sharing (UNOS), which is a non-profit charitable organization. The OPTN helps create and define organ sharing policies that make the best use of donated organs. This process continuously evaluating new advances and discoveries so policies can be adapted to best serve patients waiting for transplants. To do so, the OPTN works closely with transplant professionals, transplant patients, transplant candidates, donor families, living donors, and the public. All transplant programs and organ procurement organizations throughout the country are OPTN members and are obligated to follow the policies the OPTN creates for allocating organs.                                The OPTN also is responsible for:      Providing educational material for patients,  the public, and professionals.    Raising awareness of the need for donated organs and tissue.    Coordinating organ procurement, matching, and placement.    Collecting information about every organ transplant and donation that occurs in the [place].    Remember, you should contact your transplant hospital directly if you have questions or concerns about your own medical care including medical records, work-up progress, and test results.    We are not your transplant hospital, and our staff will not be able to answer questions about your case, so please keep your transplant hospital s phone number handy.    However, while you research your transplant needs and learn as much as you can about transplantation and donation, we welcome your call to our toll-free patient services line at [phone].          Updated 4/1/2019

## 2023-01-27 ENCOUNTER — TELEPHONE (OUTPATIENT)
Dept: TRANSPLANT | Facility: CLINIC | Age: 33
End: 2023-01-27
Payer: COMMERCIAL

## 2023-01-27 NOTE — TELEPHONE ENCOUNTER
Called pt's mobile number, a Korean speaking man answered - the phone number listed is incorrect.      Called pt's mother number, left  requesting call back to direct line.    Found two numbers listed in CE: [phone] and [phone] - both numbers rang and went to , but the mail box was not labeled, therefore I did not leave a VM. Will continue to try to reach pt.

## 2023-01-31 ENCOUNTER — TELEPHONE (OUTPATIENT)
Dept: TRANSPLANT | Facility: CLINIC | Age: 33
End: 2023-01-31
Payer: COMMERCIAL

## 2023-01-31 NOTE — TELEPHONE ENCOUNTER
Attempted to reach pt, called  [phone] and [phone] (both numbers listed in CE) VM box did not have name attached, did not leave message.    Called pt's mother, left VM requesting call back to direct line.

## 2023-02-24 ENCOUNTER — TELEPHONE (OUTPATIENT)
Dept: TRANSPLANT | Facility: CLINIC | Age: 33
End: 2023-02-24
Payer: COMMERCIAL

## 2023-03-03 ENCOUNTER — TELEPHONE (OUTPATIENT)
Dept: TRANSPLANT | Facility: CLINIC | Age: 33
End: 2023-03-03
Payer: COMMERCIAL

## 2023-03-07 ENCOUNTER — TELEPHONE (OUTPATIENT)
Dept: TRANSPLANT | Facility: CLINIC | Age: 33
End: 2023-03-07
Payer: COMMERCIAL

## 2023-03-07 NOTE — TELEPHONE ENCOUNTER
Pt returned my call, we reviewed his remaining evaluation items:    1. Cards  2. Dental   3. A1c under 8  4. CT Ab/Pelv  5. 2nd dose COVID  6. Wt loss recommended   7. SW follow up 6 months  8. MTP    I explained that scheduling will call him to set up Cardiology and imaging.  He will let me know when he completes the remaining items. We discussed that he will need to call me when he completes MTP so we can review that information. He had no further questions at this time. Phone number updated in Epic to reflect correct number.

## 2023-05-01 ENCOUNTER — TELEPHONE (OUTPATIENT)
Dept: TRANSPLANT | Facility: CLINIC | Age: 33
End: 2023-05-01

## 2023-05-01 ENCOUNTER — ANCILLARY PROCEDURE (OUTPATIENT)
Dept: CT IMAGING | Facility: CLINIC | Age: 33
End: 2023-05-01
Attending: NURSE PRACTITIONER
Payer: COMMERCIAL

## 2023-05-01 DIAGNOSIS — I15.0 RENOVASCULAR HYPERTENSION: ICD-10-CM

## 2023-05-01 DIAGNOSIS — Z76.82 ORGAN TRANSPLANT CANDIDATE: ICD-10-CM

## 2023-05-01 DIAGNOSIS — Z01.818 ENCOUNTER FOR PRE-TRANSPLANT EVALUATION FOR KIDNEY AND PANCREAS TRANSPLANT: ICD-10-CM

## 2023-05-01 DIAGNOSIS — N28.1 RENAL CYST: Primary | ICD-10-CM

## 2023-05-01 DIAGNOSIS — Z87.891 HISTORY OF TOBACCO USE: ICD-10-CM

## 2023-05-01 DIAGNOSIS — E10.21 TYPE 1 DIABETES MELLITUS WITH NEPHROPATHY (H): ICD-10-CM

## 2023-05-01 DIAGNOSIS — N18.5 CHRONIC KIDNEY DISEASE, STAGE 5, KIDNEY FAILURE (H): ICD-10-CM

## 2023-05-01 LAB — RADIOLOGIST FLAGS: NORMAL

## 2023-05-01 PROCEDURE — 74176 CT ABD & PELVIS W/O CONTRAST: CPT | Performed by: STUDENT IN AN ORGANIZED HEALTH CARE EDUCATION/TRAINING PROGRAM

## 2023-05-02 ENCOUNTER — TEAM CONFERENCE (OUTPATIENT)
Dept: TRANSPLANT | Facility: CLINIC | Age: 33
End: 2023-05-02
Payer: COMMERCIAL

## 2023-05-02 DIAGNOSIS — Z76.82 ORGAN TRANSPLANT CANDIDATE: ICD-10-CM

## 2023-05-02 DIAGNOSIS — E10.9 TYPE 1 DIABETES MELLITUS (H): ICD-10-CM

## 2023-05-02 DIAGNOSIS — I10 ESSENTIAL HYPERTENSION: ICD-10-CM

## 2023-05-02 DIAGNOSIS — N18.4 CHRONIC KIDNEY DISEASE, STAGE IV (SEVERE) (H): ICD-10-CM

## 2023-05-02 DIAGNOSIS — Z01.818 ENCOUNTER FOR PRE-TRANSPLANT EVALUATION FOR KIDNEY AND PANCREAS TRANSPLANT: ICD-10-CM

## 2023-05-02 NOTE — TELEPHONE ENCOUNTER
Image Review Meeting    ATTENDEES: Dr. Torres    IMAGES REVIEWED: Ab/Pelv CT 5/1/23    DECISION: vessels suitable for transplant    INCIDENTALS: Yes: Probable simple renal cyst on the right.  Consider renal ultrasound  for further evaluation    Will get renal US

## 2023-05-16 ENCOUNTER — TELEPHONE (OUTPATIENT)
Dept: TRANSPLANT | Facility: CLINIC | Age: 33
End: 2023-05-16
Payer: COMMERCIAL

## 2023-05-16 NOTE — TELEPHONE ENCOUNTER
Provider Call: General  Route to LPN    Reason for call: Renata care coordinator from [employer] would like a follow up about pt care ,compliance , concerns, last Blood pressure on file and pt next scheduled appointment. Please leave Renata a detailed message on her confidential line or e-mail at [email or web address]    Call back needed? Yes    Return Call Needed  Same as documented in contacts section  When to return call?: Greater than one day: Route standard priority

## 2023-05-19 NOTE — TELEPHONE ENCOUNTER
Returned call to ANIBAL Yanez w/ [employer].  Left VM explaining status of pt's work up.  Provided direct line for follow up call if needed.

## 2023-05-22 ENCOUNTER — TELEPHONE (OUTPATIENT)
Dept: TRANSPLANT | Facility: CLINIC | Age: 33
End: 2023-05-22
Payer: COMMERCIAL

## 2023-05-22 NOTE — TELEPHONE ENCOUNTER
Called pt to let him know we have set up a renal US on 6/523 w/ check in time of 4:35 PM prior to his visit w/ Cardiology at 6PM. He stated that would work and had no further questions.

## 2023-05-26 NOTE — TELEPHONE ENCOUNTER
RECORDS RECEIVED FROM:    DATE RECEIVED:    NOTES STATUS DETAILS   OFFICE NOTE from referring provider  Internal SOT   OFFICE NOTE from other cardiologists  N/A    RECORDS from hospital/ED N/A    MEDICATION LIST Internal    GENERAL CARDIO RECORDS   (ALL APPOINTMENT TYPES)     LABS (CBC,BMP,CMP, TSH) Internal    EKG (STRIPS & REPORTS) Internal 1-18-23   MONITORS (STRIPS & REPORTS) N/A    ECHOS (IMAGES AND REPORTS) Internal 1-18-23   STRESS TESTS (IMAGES AND REPORTS) N/A    cMRI (IMAGES AND REPORTS) N/A    CT/CTA (IMAGES AND REPORTS) N/A      Action 5/26/23   Fax #[phone]   Action Taken Requested EKGs 3-24-22, 1-14-22    Sent EKGs to Epic 6/1

## 2023-06-01 NOTE — PROGRESS NOTES
[place] [place]      Referring provider:Aakash Joel APRN CNP    HPI: Mr. Sugar Holland is a 33 year old  male with PMH significant for    -Diabetes type 1 with poorly controlled diabetes  -Obesity, body mass index 31  -Candidate for kidney/pancreas transplant  -End-stage renal disease, not on dialysis  -Former smoker, quit smoking 4 years ago currently vaping    Patient is being seen today for cardiovascular evaluation prior to kidney/pancreas transplant.  CKD is likely due to diabetic nephropathy.  Patient has been type I diabetis since age 13.  He tells me that he is not working.  He is very sedentary.  He walks maybe 10 minutes every other day.  He is able to climb the stairs at home.  No chest pain, shortness of breath, dizziness, syncope or lower extremity edema.  He does not monitor his blood pressure at home.  He tells me he has been running out of clonidine for the last few days. BP is high in clinic today. He sees nephrology at outside hospital who has increased his clonidine recently but he was not able to  this new patch.  No prior history of CAD.  Stress test in 2018 was normal.  Most recent echocardiogram 1/18/2023 showed normal biventricular function with no valvular disease.    Current medications:  Amlodipine 10 mg  Carvedilol 25 mg  Clonidine patch   Insulin    Medications, personal, family, and social history reviewed with patient and revised.    PAST MEDICAL HISTORY:  Past Medical History:   Diagnosis Date     Diabetes (H)      Diabetic retinopathy (H)      Hypertension        CURRENT MEDICATIONS:  Current Outpatient Medications   Medication Sig Dispense Refill     amLODIPine (NORVASC) 10 MG tablet Take 10 mg by mouth       BD NADYA U/F 32G X 4 MM insulin pen needle USE FOUR TIMES DAILY       blood glucose monitoring (ULTRA THIN 30G) lancets USE TO TEST FOUR TIMES DAILY AS  DIRECTED.       carvedilol (COREG) 25 MG tablet Take 25 mg by mouth       Continuous Blood Gluc  (DEXCOM G6 ) VALENTIN FOR USE WITH CONTINUOUS GLUCOSE MONITORING SYSTEM.       Continuous Blood Gluc Sensor (DEXCOM G6 SENSOR) MISC CHANGE EVERY 10 DAYS       Continuous Blood Gluc Transmit (DEXCOM G6 TRANSMITTER) MISC CHANGE EVERY 3 MONTHS       HYDROcodone-acetaminophen (NORCO) 5-325 MG per tablet Take 1-2 tablets by mouth every 4 hours as needed for moderate to severe pain (Patient not taking: Reported on 1/18/2023) 15 tablet 0     insulin aspart (NOVOLOG FLEXPEN) 100 UNIT/ML soln Sliding scale dosage, as per your routine 1 Month 0     Insulin Aspart (NOVOLOG SC)        insulin detemir (LEVEMIR FLEXTOUCH) 100 UNIT/ML pen Inject 50 Units Subcutaneous       insulin glargine (LANTUS SOLOSTAR) 100 UNIT/ML PEN 48 units at bedtime 1 Month 0     insulin glargine (LANTUS VIAL) 100 UNIT/ML soln Inject Subcutaneous At Bedtime (Patient not taking: Reported on 1/18/2023)       LISINOPRIL PO  (Patient not taking: Reported on 1/18/2023)         PAST SURGICAL HISTORY:  Past Surgical History:   Procedure Laterality Date     EYE SURGERY       ORTHOPEDIC SURGERY         ALLERGIES:   No Known Allergies    FAMILY HISTORY:  Family History   Problem Relation Age of Onset     Diabetes Mother      Heart Disease Maternal Grandmother      Colon Cancer Maternal Grandmother 52     Diabetes Maternal Aunt      Heart Disease Maternal Aunt      Diabetes Maternal Uncle      Heart Disease Maternal Uncle          SOCIAL HISTORY:  Social History     Tobacco Use     Smoking status: Every Day     Packs/day: 0.50     Types: Cigarettes     Smokeless tobacco: Never   Substance Use Topics     Alcohol use: No     Drug use: No       ROS:   Constitutional: No fever, chills, or sweats. Weight stable.   Cardiovascular: As per HPI.       Exam:  BP (!) 170/109 (BP Location: Right arm, Patient Position: Sitting, Cuff Size: Adult Large)   Pulse 89   Ht  "1.69 m (5' 6.54\")   Wt 90.6 kg (199 lb 11.2 oz)   SpO2 100%   BMI 31.72 kg/m    GENERAL APPEARANCE: alert and no distress  HEENT: no icterus, no central cyanosis  LYMPH/NECK: no adenopathy, no asymmetry, JVP not elevated, no carotid bruits.  RESPIRATORY: lungs clear to auscultation - no rales, rhonchi or wheezes, no use of accessory muscles, no retractions, respirations are unlabored, normal respiratory rate  CARDIOVASCULAR: regular rhythm, normal S1, S2, no S3 or S4 and no murmur, click or rub, precordium quiet with normal PMI.  GI: soft, non tender  EXTREMITIES: no edema  NEURO: alert, normal speech,and affect  SKIN: no ecchymoses, no rashes     I have reviewed the labs and personally reviewed the imaging below and made my comment in the assessment and plan.    Labs:  CBC RESULTS:   Lab Results   Component Value Date    WBC 7.6 01/18/2023    WBC 6.5 06/20/2016    RBC 4.46 01/18/2023    RBC 4.25 (L) 06/20/2016    HGB 11.9 (L) 01/18/2023    HGB 12.1 (L) 06/20/2016    HCT 36.3 (L) 01/18/2023    HCT 34.5 (L) 06/20/2016    MCV 81 01/18/2023    MCV 81 06/20/2016    MCH 26.7 01/18/2023    MCH 28.5 06/20/2016    MCHC 32.8 01/18/2023    MCHC 35.1 06/20/2016    RDW 12.6 01/18/2023    RDW 12.8 06/20/2016     01/18/2023     06/20/2016       BMP RESULTS:  Lab Results   Component Value Date     01/18/2023     06/20/2016    POTASSIUM 4.2 01/18/2023    POTASSIUM 3.6 12/14/2021    POTASSIUM 4.5 06/20/2016    CHLORIDE 108 (H) 01/18/2023    CHLORIDE 112 (H) 12/14/2021    CHLORIDE 98 06/20/2016    CO2 18 (L) 01/18/2023    CO2 24 12/14/2021    CO2 25 06/20/2016    ANIONGAP 15 01/18/2023    ANIONGAP 8 12/14/2021    ANIONGAP 12 06/20/2016     (H) 01/18/2023     (H) 12/14/2021     (H) 06/20/2016    BUN 78.6 (H) 01/18/2023    BUN 28 12/14/2021    BUN 23 06/20/2016    CR 7.24 (H) 01/18/2023    CR 1.73 (H) 06/20/2016    GFRESTIMATED 10 (L) 01/18/2023    GFRESTIMATED 48 (L) 06/20/2016    " GFRESTBLACK 58 (L) 06/20/2016    AGUSTIN 9.2 01/18/2023    AGUSTIN 8.4 (L) 06/20/2016        Echocardiogram 1/18/2023  Global and regional left ventricular function is normal with an EF of 55-60%.  Right ventricular function, chamber size, wall motion, and thickness are  normal.  Both atria appear normal.  The inferior vena cava is normal.  No pericardial effusion is present.  There is no prior study for direct comparison.    EKG 1/18/2023 sinus rhythm, nonspecific T wave changes          Assessment and Plan:     #End-stage renal disease currently not on dialysis  #Type I diabetic with poorly controlled diabetes and hypertension  #Former smoker  #Sedentary lifestyle  # Obesity  #-Candidate for kidney/pancreas transplant  -Patient is currently asymptomatic from cardiac standpoint.  Euvolemic on exam.  He is very sedentary so I cannot assess fully his functional capacity.  He tells me that he can climb stairs at home and walk maybe 10 minutes every other day.  That is all he does from physical activity standpoint.  Recent transthoracic echocardiogram is unremarkable.  I think patient has significant cardiovascular risk factors that warrants coronary angiogram prior to kidney/pancreas transplant.  We will communicate with transplant team with regards to timing of coronary angiogram.    #Uncontrolled blood pressure  -Managed by nephrology.  Recently clonidine dose was increased.  -Encouraged patient for self monitoring blood pressure at home and discuss with nephrology.  -Continue current treatment with carvedilol, amlodipine and clonidine.    No medication changes today.  Return to clinic as needed.    Total time spent today for this visit is 60 minutes including precharting, face-to-face clinic visit, review of labs/imaging and medical documentation.    Soraya DUMONT MD  [facility] Division of Cardiology  Pager [phone]

## 2023-06-05 ENCOUNTER — PRE VISIT (OUTPATIENT)
Dept: CARDIOLOGY | Facility: CLINIC | Age: 33
End: 2023-06-05

## 2023-06-05 ENCOUNTER — OFFICE VISIT (OUTPATIENT)
Dept: CARDIOLOGY | Facility: CLINIC | Age: 33
End: 2023-06-05
Attending: INTERNAL MEDICINE
Payer: COMMERCIAL

## 2023-06-05 ENCOUNTER — ANCILLARY PROCEDURE (OUTPATIENT)
Dept: ULTRASOUND IMAGING | Facility: CLINIC | Age: 33
End: 2023-06-05
Attending: NURSE PRACTITIONER
Payer: COMMERCIAL

## 2023-06-05 VITALS
BODY MASS INDEX: 31.34 KG/M2 | WEIGHT: 199.7 LBS | SYSTOLIC BLOOD PRESSURE: 170 MMHG | HEIGHT: 67 IN | OXYGEN SATURATION: 100 % | DIASTOLIC BLOOD PRESSURE: 109 MMHG | HEART RATE: 89 BPM

## 2023-06-05 DIAGNOSIS — N18.5 CHRONIC RENAL FAILURE, STAGE 5 (H): ICD-10-CM

## 2023-06-05 DIAGNOSIS — Z76.82 KIDNEY TRANSPLANT CANDIDATE: ICD-10-CM

## 2023-06-05 DIAGNOSIS — E10.22 TYPE 1 DIABETES MELLITUS WITH STAGE 5 CHRONIC KIDNEY DISEASE NOT ON CHRONIC DIALYSIS (H): ICD-10-CM

## 2023-06-05 DIAGNOSIS — Z76.82 PANCREAS TRANSPLANT CANDIDATE: ICD-10-CM

## 2023-06-05 DIAGNOSIS — Z01.810 PRE-OPERATIVE CARDIOVASCULAR EXAMINATION: Primary | ICD-10-CM

## 2023-06-05 DIAGNOSIS — N28.1 RENAL CYST: ICD-10-CM

## 2023-06-05 DIAGNOSIS — Z76.82 ORGAN TRANSPLANT CANDIDATE: ICD-10-CM

## 2023-06-05 DIAGNOSIS — N18.5 TYPE 1 DIABETES MELLITUS WITH STAGE 5 CHRONIC KIDNEY DISEASE NOT ON CHRONIC DIALYSIS (H): ICD-10-CM

## 2023-06-05 DIAGNOSIS — Z87.891 HISTORY OF TOBACCO USE: ICD-10-CM

## 2023-06-05 PROCEDURE — 76770 US EXAM ABDO BACK WALL COMP: CPT | Mod: GC | Performed by: RADIOLOGY

## 2023-06-05 PROCEDURE — 99205 OFFICE O/P NEW HI 60 MIN: CPT | Performed by: INTERNAL MEDICINE

## 2023-06-05 PROCEDURE — G0463 HOSPITAL OUTPT CLINIC VISIT: HCPCS | Performed by: INTERNAL MEDICINE

## 2023-06-05 RX ORDER — CALCITRIOL 0.25 UG/1
CAPSULE, LIQUID FILLED ORAL
COMMUNITY
Start: 2023-06-04

## 2023-06-05 RX ORDER — CLONIDINE 0.2 MG/24H
PATCH, EXTENDED RELEASE TRANSDERMAL
COMMUNITY
Start: 2023-06-04

## 2023-06-05 RX ORDER — SODIUM ZIRCONIUM CYCLOSILICATE 10 G/10G
POWDER, FOR SUSPENSION ORAL
COMMUNITY
Start: 2023-05-19

## 2023-06-05 ASSESSMENT — PAIN SCALES - GENERAL: PAINLEVEL: NO PAIN (0)

## 2023-06-05 NOTE — LETTER
6/5/2023      RE: Sugar Holland  [address]  [place] [address]       Dear Colleague,    Thank you for the opportunity to participate in the care of your patient, Sugar Holland, at the [facility] [place] at [facility]. Please see a copy of my visit note below.                                                                                                [place] Cardiology Clinic[place] [place]      Referring provider:Aakash Joel APRN CNP    HPI: Mr. Sugar Holland is a 33 year old  male with PMH significant for    -Diabetes type 1 with poorly controlled diabetes  -Obesity, body mass index 31  -Candidate for kidney/pancreas transplant  -End-stage renal disease, not on dialysis  -Former smoker, quit smoking 4 years ago currently vaping    Patient is being seen today for cardiovascular evaluation prior to kidney/pancreas transplant.  CKD is likely due to diabetic nephropathy.  Patient has been type I diabetis since age 13.  He tells me that he is not working.  He is very sedentary.  He walks maybe 10 minutes every other day.  He is able to climb the stairs at home.  No chest pain, shortness of breath, dizziness, syncope or lower extremity edema.  He does not monitor his blood pressure at home.  He tells me he has been running out of clonidine for the last few days. BP is high in clinic today. He sees nephrology at outside hospital who has increased his clonidine recently but he was not able to  this new patch.  No prior history of CAD.  Stress test in 2018 was normal.  Most recent echocardiogram 1/18/2023 showed normal biventricular function with no valvular disease.    Current medications:  Amlodipine 10 mg  Carvedilol 25 mg  Clonidine patch   Insulin    Medications, personal, family, and social history reviewed with patient and revised.    PAST MEDICAL HISTORY:  Past Medical History:   Diagnosis Date    Diabetes (H)     Diabetic  retinopathy (H)     Hypertension        CURRENT MEDICATIONS:  Current Outpatient Medications   Medication Sig Dispense Refill    amLODIPine (NORVASC) 10 MG tablet Take 10 mg by mouth      BD NADYA U/F 32G X 4 MM insulin pen needle USE FOUR TIMES DAILY      blood glucose monitoring (ULTRA THIN 30G) lancets USE TO TEST FOUR TIMES DAILY AS DIRECTED.      carvedilol (COREG) 25 MG tablet Take 25 mg by mouth      Continuous Blood Gluc  (DEXCOM G6 ) VALENTIN FOR USE WITH CONTINUOUS GLUCOSE MONITORING SYSTEM.      Continuous Blood Gluc Sensor (DEXCOM G6 SENSOR) MISC CHANGE EVERY 10 DAYS      Continuous Blood Gluc Transmit (DEXCOM G6 TRANSMITTER) MISC CHANGE EVERY 3 MONTHS      HYDROcodone-acetaminophen (NORCO) 5-325 MG per tablet Take 1-2 tablets by mouth every 4 hours as needed for moderate to severe pain (Patient not taking: Reported on 1/18/2023) 15 tablet 0    insulin aspart (NOVOLOG FLEXPEN) 100 UNIT/ML soln Sliding scale dosage, as per your routine 1 Month 0    Insulin Aspart (NOVOLOG SC)       insulin detemir (LEVEMIR FLEXTOUCH) 100 UNIT/ML pen Inject 50 Units Subcutaneous      insulin glargine (LANTUS SOLOSTAR) 100 UNIT/ML PEN 48 units at bedtime 1 Month 0    insulin glargine (LANTUS VIAL) 100 UNIT/ML soln Inject Subcutaneous At Bedtime (Patient not taking: Reported on 1/18/2023)      LISINOPRIL PO  (Patient not taking: Reported on 1/18/2023)         PAST SURGICAL HISTORY:  Past Surgical History:   Procedure Laterality Date    EYE SURGERY      ORTHOPEDIC SURGERY         ALLERGIES:   No Known Allergies    FAMILY HISTORY:  Family History   Problem Relation Age of Onset    Diabetes Mother     Heart Disease Maternal Grandmother     Colon Cancer Maternal Grandmother 52    Diabetes Maternal Aunt     Heart Disease Maternal Aunt     Diabetes Maternal Uncle     Heart Disease Maternal Uncle          SOCIAL HISTORY:  Social History     Tobacco Use    Smoking status: Every Day     Packs/day: 0.50     Types:  "Cigarettes    Smokeless tobacco: Never   Substance Use Topics    Alcohol use: No    Drug use: No       ROS:   Constitutional: No fever, chills, or sweats. Weight stable.   Cardiovascular: As per HPI.       Exam:  BP (!) 170/109 (BP Location: Right arm, Patient Position: Sitting, Cuff Size: Adult Large)   Pulse 89   Ht 1.69 m (5' 6.54\")   Wt 90.6 kg (199 lb 11.2 oz)   SpO2 100%   BMI 31.72 kg/m    GENERAL APPEARANCE: alert and no distress  HEENT: no icterus, no central cyanosis  LYMPH/NECK: no adenopathy, no asymmetry, JVP not elevated, no carotid bruits.  RESPIRATORY: lungs clear to auscultation - no rales, rhonchi or wheezes, no use of accessory muscles, no retractions, respirations are unlabored, normal respiratory rate  CARDIOVASCULAR: regular rhythm, normal S1, S2, no S3 or S4 and no murmur, click or rub, precordium quiet with normal PMI.  GI: soft, non tender  EXTREMITIES: no edema  NEURO: alert, normal speech,and affect  SKIN: no ecchymoses, no rashes     I have reviewed the labs and personally reviewed the imaging below and made my comment in the assessment and plan.    Labs:  CBC RESULTS:   Lab Results   Component Value Date    WBC 7.6 01/18/2023    WBC 6.5 06/20/2016    RBC 4.46 01/18/2023    RBC 4.25 (L) 06/20/2016    HGB 11.9 (L) 01/18/2023    HGB 12.1 (L) 06/20/2016    HCT 36.3 (L) 01/18/2023    HCT 34.5 (L) 06/20/2016    MCV 81 01/18/2023    MCV 81 06/20/2016    MCH 26.7 01/18/2023    MCH 28.5 06/20/2016    MCHC 32.8 01/18/2023    MCHC 35.1 06/20/2016    RDW 12.6 01/18/2023    RDW 12.8 06/20/2016     01/18/2023     06/20/2016       BMP RESULTS:  Lab Results   Component Value Date     01/18/2023     06/20/2016    POTASSIUM 4.2 01/18/2023    POTASSIUM 3.6 12/14/2021    POTASSIUM 4.5 06/20/2016    CHLORIDE 108 (H) 01/18/2023    CHLORIDE 112 (H) 12/14/2021    CHLORIDE 98 06/20/2016    CO2 18 (L) 01/18/2023    CO2 24 12/14/2021    CO2 25 06/20/2016    ANIONGAP 15 01/18/2023    " ANIONGAP 8 12/14/2021    ANIONGAP 12 06/20/2016     (H) 01/18/2023     (H) 12/14/2021     (H) 06/20/2016    BUN 78.6 (H) 01/18/2023    BUN 28 12/14/2021    BUN 23 06/20/2016    CR 7.24 (H) 01/18/2023    CR 1.73 (H) 06/20/2016    GFRESTIMATED 10 (L) 01/18/2023    GFRESTIMATED 48 (L) 06/20/2016    GFRESTBLACK 58 (L) 06/20/2016    AGUSTIN 9.2 01/18/2023    AGUSTIN 8.4 (L) 06/20/2016        Echocardiogram 1/18/2023  Global and regional left ventricular function is normal with an EF of 55-60%.  Right ventricular function, chamber size, wall motion, and thickness are  normal.  Both atria appear normal.  The inferior vena cava is normal.  No pericardial effusion is present.  There is no prior study for direct comparison.    EKG 1/18/2023 sinus rhythm, nonspecific T wave changes          Assessment and Plan:     #End-stage renal disease currently not on dialysis  #Type I diabetic with poorly controlled diabetes and hypertension  #Former smoker  #Sedentary lifestyle  # Obesity  #-Candidate for kidney/pancreas transplant  -Patient is currently asymptomatic from cardiac standpoint.  Euvolemic on exam.  He is very sedentary so I cannot assess fully his functional capacity.  He tells me that he can climb stairs at home and walk maybe 10 minutes every other day.  That is all he does from physical activity standpoint.  Recent transthoracic echocardiogram is unremarkable.  I think patient has significant cardiovascular risk factors that warrants coronary angiogram prior to kidney/pancreas transplant.  We will communicate with transplant team with regards to timing of coronary angiogram.    #Uncontrolled blood pressure  -Managed by nephrology.  Recently clonidine dose was increased.  -Encouraged patient for self monitoring blood pressure at home and discuss with nephrology.  -Continue current treatment with carvedilol, amlodipine and clonidine.    No medication changes today.  Return to clinic as needed.    Total time  spent today for this visit is 60 minutes including precharting, face-to-face clinic visit, review of labs/imaging and medical documentation.    Soraya DUMONT MD  [facility] Division of Cardiology  Pager [phone]      Please do not hesitate to contact me if you have any questions/concerns.     Sincerely,     Soraya Dumont MD

## 2023-06-05 NOTE — PATIENT INSTRUCTIONS
Walk at least 30 minutes per day.    Stop vaping.    Obtain a blood pressure monitor with an arm cuff and record daily, always after at least 10 minutes of calm rest.  Keep a log.  Present the log to your nephrologist at your next appointment for further blood pressure management.    Once you are on dialysis, your transplant team will notify us that you may undergo a coronary angiogram.

## 2023-06-05 NOTE — NURSING NOTE
Chief Complaint   Patient presents with     New Patient     new - SOT eval       Vitals were taken, medications reconciled.    Elly Love, EMT   5:24 PM

## 2023-06-21 ENCOUNTER — TELEPHONE (OUTPATIENT)
Dept: TRANSPLANT | Facility: CLINIC | Age: 33
End: 2023-06-21
Payer: COMMERCIAL

## 2023-06-21 ENCOUNTER — TELEPHONE (OUTPATIENT)
Dept: PHARMACY | Facility: OTHER | Age: 33
End: 2023-06-21
Payer: COMMERCIAL

## 2023-06-21 DIAGNOSIS — Z01.818 ENCOUNTER FOR PRE-TRANSPLANT EVALUATION FOR KIDNEY AND PANCREAS TRANSPLANT: ICD-10-CM

## 2023-06-21 DIAGNOSIS — I10 ESSENTIAL HYPERTENSION: ICD-10-CM

## 2023-06-21 DIAGNOSIS — E10.9 TYPE 1 DIABETES MELLITUS (H): ICD-10-CM

## 2023-06-21 DIAGNOSIS — Z76.82 ORGAN TRANSPLANT CANDIDATE: ICD-10-CM

## 2023-06-21 DIAGNOSIS — N18.4 CHRONIC KIDNEY DISEASE, STAGE IV (SEVERE) (H): ICD-10-CM

## 2023-06-21 NOTE — TELEPHONE ENCOUNTER
Called to offer an MTM appointment. No answer, LVM with information to schedule if interested at [phone].     Medina Hussein, PharmD, [facility]  Medication Therapy Management Pharmacist   June 21, 2023

## 2023-06-22 NOTE — TELEPHONE ENCOUNTER
Pt called to touch base on pre-SPK evaluation status.  He has now seen Cardiology and will need to undergo an angiogram but since he is not on HD and does not have any donors at this time, will wait to do until he is on HD.  He completed his renal US- I will have our surgeons review that.  We discussed that SW wanted to follow up with him, so I will put in that order. His most recent A1c was 7.8- I encouraged him to keep working on that but he has met our criteria of having that at 8 or less.  He has completed the MTP videos and will call me when he has time to discuss further.  He also states he completed his dental work but could not recall where it was done, he will look into that. I will enter an order for him to follow up w/ SW. He had no further questions.

## 2023-06-28 ENCOUNTER — VIRTUAL VISIT (OUTPATIENT)
Dept: TRANSPLANT | Facility: CLINIC | Age: 33
End: 2023-06-28
Attending: PHYSICIAN ASSISTANT
Payer: COMMERCIAL

## 2023-06-28 DIAGNOSIS — N18.4 CHRONIC KIDNEY DISEASE, STAGE IV (SEVERE) (H): ICD-10-CM

## 2023-06-28 DIAGNOSIS — Z76.82 ORGAN TRANSPLANT CANDIDATE: ICD-10-CM

## 2023-06-28 DIAGNOSIS — Z01.818 ENCOUNTER FOR PRE-TRANSPLANT EVALUATION FOR KIDNEY AND PANCREAS TRANSPLANT: ICD-10-CM

## 2023-06-28 DIAGNOSIS — E10.9 TYPE 1 DIABETES MELLITUS (H): ICD-10-CM

## 2023-06-28 DIAGNOSIS — I10 ESSENTIAL HYPERTENSION: ICD-10-CM

## 2023-06-28 PROCEDURE — 99207 PR NO CHARGE COORDINATED CARE PS: CPT | Mod: 93

## 2023-06-28 NOTE — LETTER
"    2023         RE: Sugar Holland  [address]  [place] [address]        Dear Colleague,    Thank you for referring your patient, Sugar Holland, to the [facility] TRANSPLANT [facility]. Please see a copy of my visit note below.    Patient Name: Sugar Holland  : 1990  Age: 33 year old  MRN: [id number]  Date of Initial Social Work Evaluation: 2023     Patient on transplant wait list.  Saw today to update psychosocial assessment.      Presenting Information   Living Situation:   [address]   [place] [address]  Sugar resides in an apt in [place] with his SO Svetlana. They have been together for 6 years. She recently moved in (previously was living alone).   If not local, plans for short term stay:  NA   Previous Functional Status: Independent ADL's   Cultural/Language/Spiritual Considerations: NA     Support System  Primary Support Person TBD   Other support:  TBD   Plan for support in immediate post-transplant period: Sugar notes that he has a support system of people but still needs to identify who will be his support person post transplant. SW noted that this can be one person or a few different people sharing this role.     SW reported that Sugar needs to have an open, honest conversation with his family on who can help him post transplant and educated Sugar on post transplant caregiver roles. Of note, he does not have an identified person yet. [employer] will send the caregiver contract and reported that Sugar will need to provide this information to caregiver.     Health Care Directive  Decision Maker: Self   Alternate Decision Maker: NOK-parents   Health Care Directive: Provided education    Mental Health/Coping:   History of Mental Health: Sugar has no mental health history. He reports that he has been more stressed out lately due to health but symptoms are manageable.   History of Chemical Health: None   Current status: Sugar reports that he is a \"calm person,\" " overall. He has had increased stressors but is coping appropriately.  Coping: appropriate  Services Needed/Recommended: NA    Financial   Income: TBD- Sugar has applied for SSDI but does not yet have this. He is not working. He last worked in 2020. He reports that his parents and SO are financially supporting him. SW advised applying for additional county support and pursuing part time employment.   Impact of transplant on income: Impact on SSDI on year post, if obtained.   Insurance and medication coverage: [employer]   Financial concerns: Sugar does not have stable income. It is unclear if he can medically not work or is making a choice to stay unemployed. He is not yet on dialysis so this is not an employment factor.   Resources needed: [email or web address]    Assessment and recommendations and plan:  Reviewed transplant education (Medicare, rehabilitation, donor issues, community/financial resources, and psych/family adjustment) as well as psychosocial risks of transplant. Provided patient with a copy of post-transplant informational sheet that includes information on potential costs of medications, Medicare ESRD, post-transplant lodging, etc. Patient seemed to process information well. Appeared well informed, motivated, and able to follow post transplant requirements. Behavior was appropriate during interview. Has adequate income and insurance coverage. Adequate social support. No major contraindications noted for transplant. At this time, patient appears to understand the risks and benefits of transplant.     1. Obtain additional/stable finances   2. Clarify Support plan     Lynette BHATT [facility]  Outpatient Kidney/Pancreas/[facility] Transplant Program   [address] [place] [address] [place] [id number]  [email or web address]  [email or web address]  Office: [phone] I Fax: [phone]

## 2023-06-28 NOTE — PROGRESS NOTES
"Patient Name: Sugar Holland  : 1990  Age: 33 year old  MRN: [id number]  Date of Initial Social Work Evaluation: 2023     Patient on transplant wait list.  Saw today to update psychosocial assessment.      Presenting Information   Living Situation:   [address]   [place] [address]  Sugar resides in an apt in [place] with his SO Svetlana. They have been together for 6 years. She recently moved in (previously was living alone).   If not local, plans for short term stay:  NA   Previous Functional Status: Independent ADL's   Cultural/Language/Spiritual Considerations: NA     Support System  Primary Support Person TBD   Other support:  TBD   Plan for support in immediate post-transplant period: Sugar notes that he has a support system of people but still needs to identify who will be his support person post transplant. BERTRAND noted that this can be one person or a few different people sharing this role.     BERTRAND reported that Sugar needs to have an open, honest conversation with his family on who can help him post transplant and educated Sugar on post transplant caregiver roles. Of note, he does not have an identified person yet. SW will send the caregiver contract and reported that Sugar will need to provide this information to caregiver.     Health Care Directive  Decision Maker: Self   Alternate Decision Maker: NOK-parents   Health Care Directive: Provided education    Mental Health/Coping:   History of Mental Health: Sugar has no mental health history. He reports that he has been more stressed out lately due to health but symptoms are manageable.   History of Chemical Health: None   Current status: Sugar reports that he is a \"calm person,\" overall. He has had increased stressors but is coping appropriately.  Coping: appropriate  Services Needed/Recommended: NA    Financial   Income: TBD- Sugar has applied for SSDI but does not yet have this. He is not working. He last worked in . He " reports that his parents and SO are financially supporting him. SW advised applying for additional [facility] support and pursuing part time employment.   Impact of transplant on income: Impact on SSDI on year post, if obtained.   Insurance and medication coverage: [employer]OL[employer] ANDERSON   Financial concerns: Sugar does not have stable income. It is unclear if he can medically not work or is making a choice to stay unemployed. He is not yet on dialysis so this is not an employment factor.   Resources needed: [email or web address]    Assessment and recommendations and plan:  Reviewed transplant education (Medicare, rehabilitation, donor issues, community/financial resources, and psych/family adjustment) as well as psychosocial risks of transplant. Provided patient with a copy of post-transplant informational sheet that includes information on potential costs of medications, Medicare ESRD, post-transplant lodging, etc. Patient seemed to process information well. Appeared well informed, motivated, and able to follow post transplant requirements. Behavior was appropriate during interview. Has adequate income and insurance coverage. Adequate social support. No major contraindications noted for transplant. At this time, patient appears to understand the risks and benefits of transplant.     1. Obtain additional/stable finances   2. Clarify Support plan     Lynette BHATT [facility]  Outpatient Kidney/Pancreas/Auto Islet Transplant Program   [address]  [place] [address]  [email or web address]  "[email or web address]  Office: [phone] I Fax: [phone]

## 2023-06-29 ENCOUNTER — TELEPHONE (OUTPATIENT)
Dept: PHARMACY | Facility: PHYSICIAN GROUP | Age: 33
End: 2023-06-29
Payer: COMMERCIAL

## 2023-06-29 NOTE — TELEPHONE ENCOUNTER
MTM referral from: Patient's insurance ([employer] pay[employer] products)    MTM referral outreach attempt #2 on September 25, 2023 at 11:54 AM      Outcome: Opted out        Dom Lin PharmD     Medication Therapy Management (MTM) Pharmacist         Called patient to discuss scheduling an MTM appointment based on an insurance referral. Left voicemail with our scheduling line, [phone], to call back.    Justin Garza PharmD  Medication Therapy Management Pharmacist  [facility]  Office phone: [phone]

## 2023-07-19 ENCOUNTER — TEAM CONFERENCE (OUTPATIENT)
Dept: TRANSPLANT | Facility: CLINIC | Age: 33
End: 2023-07-19
Payer: COMMERCIAL

## 2023-07-19 DIAGNOSIS — I10 ESSENTIAL HYPERTENSION: ICD-10-CM

## 2023-07-19 DIAGNOSIS — N18.9 CHRONIC RENAL FAILURE: ICD-10-CM

## 2023-07-19 DIAGNOSIS — Z76.82 ORGAN TRANSPLANT CANDIDATE: ICD-10-CM

## 2023-07-19 DIAGNOSIS — Z01.818 ENCOUNTER FOR PRE-TRANSPLANT EVALUATION FOR KIDNEY AND PANCREAS TRANSPLANT: ICD-10-CM

## 2023-07-19 DIAGNOSIS — N18.4 CHRONIC KIDNEY DISEASE, STAGE IV (SEVERE) (H): ICD-10-CM

## 2023-07-19 DIAGNOSIS — E10.9 TYPE 1 DIABETES MELLITUS (H): ICD-10-CM

## 2023-07-19 NOTE — TELEPHONE ENCOUNTER
Image Review Meeting    ATTENDEES: Dr. 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  Stated that we will need to determine who is caregiver is, update dental, review the education, and he'll need an angiogram prior to active listing. Provided direct line for any questions.

## 2023-08-01 NOTE — TELEPHONE ENCOUNTER
MEDICAL RECORDS REQUEST   [facility]  Urology Clinic  [address] [place] [address]  PHONE: [phone]  Fax: [phone]        FUTURE VISIT INFORMATION                                                   Sugar Holland, : 1990 scheduled for future visit at [facility]    APPOINTMENT INFORMATION:  Date: 2023  Provider:  Dandre Argueta MD  Reason for Visit/Diagnosis: II cyst on 23 renal US, needs clearance for renal transplant    REFERRAL INFORMATION:  Referring provider:  Brandon Estrada MD in  [facility]      RECORDS REQUESTED FOR VISIT                                                     NOTES  STATUS/DETAILS   OFFICE NOTE from other specialist  yes, all in chart   DISCHARGE SUMMARY from hospital  yes   DISCHARGE REPORT from the ER  yes   OPERATIVE REPORT  no   MEDICATION LIST  yes   LABS     URINALYSIS (UA)  yes   images  yes, 2023 -- US RENAL  2023 -- CT ABD PELVIS  2023 -- XR CHEST     PRE-VISIT CHECKLIST      Joint diagnostic appointment coordinated correctly          (ensure right order & amount of time) Yes   RECORD COLLECTION COMPLETE Yes

## 2023-08-08 ENCOUNTER — PRE VISIT (OUTPATIENT)
Dept: UROLOGY | Facility: CLINIC | Age: 33
End: 2023-08-08
Payer: COMMERCIAL

## 2023-08-08 NOTE — CONFIDENTIAL NOTE
Reason for visit: consult     Relevant information: right renal cyst    Records/imaging/labs/orders: in epic    At Rooming: mckenzie Oh  8/8/2023  3:46 PM

## 2023-08-16 ENCOUNTER — OFFICE VISIT (OUTPATIENT)
Dept: UROLOGY | Facility: CLINIC | Age: 33
End: 2023-08-16
Attending: SURGERY
Payer: COMMERCIAL

## 2023-08-16 ENCOUNTER — PRE VISIT (OUTPATIENT)
Dept: UROLOGY | Facility: CLINIC | Age: 33
End: 2023-08-16

## 2023-08-16 VITALS — DIASTOLIC BLOOD PRESSURE: 91 MMHG | HEART RATE: 90 BPM | SYSTOLIC BLOOD PRESSURE: 149 MMHG

## 2023-08-16 DIAGNOSIS — N28.1 ACQUIRED CYST OF KIDNEY: Primary | ICD-10-CM

## 2023-08-16 DIAGNOSIS — Z76.82 ORGAN TRANSPLANT CANDIDATE: ICD-10-CM

## 2023-08-16 PROCEDURE — 99204 OFFICE O/P NEW MOD 45 MIN: CPT | Performed by: UROLOGY

## 2023-08-16 NOTE — PROGRESS NOTES
Urology Oncology Clinic   Renal mass             Chief Complaint:   Cystic Renal Mass            Consult or Referral:     Sugar Holland is a 33 year old male seen in consultation.         History of Present Illness:    Sugar Holland is a very pleasant 33 year old male with history of ESRD is currently a transplant candidate.  Renal ultrasound showed a Bosniak 2 cyst with a thin septa therefore he was referred to me for further evaluation and clearance for his transplant surgery.  He still makes urine and has not been on dialysis yet.   he does not have a history of previous abdominal or retroperitoneal surgery.  There is not a family history of renal cell cancer.  The patient has a history of smoking and currently is not smoking.    ECOG Performance Score: 0 - Independent           Past Medical History:     Past Medical History:   Diagnosis Date    Diabetes (H)     Diabetic retinopathy (H)     Hypertension             Past Surgical History:     Past Surgical History:   Procedure Laterality Date    EYE SURGERY      ORTHOPEDIC SURGERY              Problem List:     Patient Active Problem List    Diagnosis Date Noted    Diabetes mellitus, type 2 (H) 01/25/2023     Priority: Medium    CKD (chronic kidney disease) stage 4, GFR 15-29 ml/min (H) 01/25/2023     Priority: Medium    History of tobacco use 01/25/2023     Priority: Medium    Renovascular hypertension 01/25/2023     Priority: Medium    Type 1 diabetes mellitus with nephropathy (H) 01/25/2023     Priority: Medium            Medications     Current Outpatient Medications   Medication    calcitRIOL (ROCALTROL) 0.25 MCG capsule    carvedilol (COREG) 25 MG tablet    cloNIDine (CATAPRES-TTS2) 0.2 MG/24HR WK patch    insulin aspart (NOVOLOG FLEXPEN) 100 UNIT/ML soln    Insulin Aspart (NOVOLOG SC)    insulin detemir (LEVEMIR FLEXTOUCH) 100 UNIT/ML pen    insulin glargine (LANTUS SOLOSTAR) 100 UNIT/ML PEN    LOKELMA 10 g PACK packet    amLODIPine (NORVASC) 10 MG  tablet    BD NADYA U/F 32G X 4 MM insulin pen needle    blood glucose monitoring (ULTRA THIN 30G) lancets    Continuous Blood Gluc  (DEXCOM G6 ) VALENTIN    Continuous Blood Gluc Sensor (DEXCOM G6 SENSOR) MISC    Continuous Blood Gluc Transmit (DEXCOM G6 TRANSMITTER) MISC    HYDROcodone-acetaminophen (NORCO) 5-325 MG per tablet    insulin glargine (LANTUS VIAL) 100 UNIT/ML soln    LISINOPRIL PO     No current facility-administered medications for this visit.            Family History:     Family History   Problem Relation Age of Onset    Diabetes Mother     Heart Disease Maternal Grandmother     Colon Cancer Maternal Grandmother 52    Diabetes Maternal Aunt     Heart Disease Maternal Aunt     Diabetes Maternal Uncle     Heart Disease Maternal Uncle             Social History:     Social History     Socioeconomic History    Marital status: Single     Spouse name: Not on file    Number of children: Not on file    Years of education: Not on file    Highest education level: Not on file   Occupational History    Not on file   Tobacco Use    Smoking status: Former     Packs/day: 0.50     Types: Cigarettes    Smokeless tobacco: Never   Substance and Sexual Activity    Alcohol use: No    Drug use: Yes     Types: Marijuana    Sexual activity: Not on file   Other Topics Concern    Not on file   Social History Narrative    Not on file     Social Determinants of Health     Financial Resource Strain: Not on file   Food Insecurity: Not on file   Transportation Needs: Not on file   Physical Activity: Not on file   Stress: Not on file   Social Connections: Not on file   Intimate Partner Violence: Not on file   Housing Stability: Not on file            Allergies:   Patient has no known allergies.         Review of Systems:  From intake questionnaire     Negative 14 system review except as noted on HPI, nurse's note see below.         Physical Exam:     Patient is a 33 year old  male There is no height or weight on file to  calculate BMI.  Constitutional: Vitals: Blood pressure (!) 149/91, pulse 90.  General Appearance Adult: Alert, no acute distress, oriented  HENT: throat/mouth:normal, good dentition  Neck: No adenopathy,masses or thyromegaly  Lungs/Repiratory: no respiratory distress, or pursed lip breathing  Heart: No obvious jugular venous distension present, normal heart rate  Abdomen: soft, nontender, no organomegaly or masses  Hematologic/Lymphatics: No cervical or supraclavicular adenopathy  Musculoskeltal: extremities normal, no peripheral edema  Skin: no noted suspicious lesions or rashes  Neuro: Alert, oriented, speech and mentation normal  Psych: affect and mood normal  Gait: Normal without assistance  : deferred        Imaging:    I personally viewed all applicable imaging and interpreted them and went over the results with the patient.     FINDINGS:     Right kidney: Measures 10.5 cm in length. Parenchyma is of normal  thickness and echogenicity. 1.4 x 1.4 x 1.6 cm renal cortex cyst  arising from the inferior pole with associated pain septation. No  hydronephrosis.     Left kidney: Measures 10.7 cm in length. Parenchyma is of normal  thickness and echogenicity. No focal mass. No hydronephrosis.      Bladder: Partially distended.                                                                      IMPRESSION:  1.  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  2.  No hydronephrosis     I have personally reviewed the examination and initial interpretation  and I agree with the findings.     LEATHA MOORE MD          Assessment and Plan:     Assessment:  33 year old male with ESRD candidate for kidney transplant with a Bosniak II renal lesion       We discussed the benign nature of the cyst and very small risk of malignancy close to zero. No contraindication for renal transplant based on this lesion.      Plan:  Follow-up in 1 yr with renal US       45 total minutes spent  on the date of the encounter including direct interaction with the patient, performing chart review, documentation and further activities as noted above      Dandre Argueta MD   Department of Urology   [facility]

## 2023-08-16 NOTE — NURSING NOTE
Chief Complaint   Patient presents with    Consult     Kidney mass consult       Blood pressure (!) 149/91, pulse 90. There is no height or weight on file to calculate BMI.    Patient Active Problem List   Diagnosis    Diabetes mellitus, type 2 (H)    CKD (chronic kidney disease) stage 4, GFR 15-29 ml/min (H)    History of tobacco use    Renovascular hypertension    Type 1 diabetes mellitus with nephropathy (H)       No Known Allergies    Current Outpatient Medications   Medication Sig Dispense Refill    calcitRIOL (ROCALTROL) 0.25 MCG capsule       carvedilol (COREG) 25 MG tablet Take 25 mg by mouth      cloNIDine (CATAPRES-TTS2) 0.2 MG/24HR WK patch       insulin aspart (NOVOLOG FLEXPEN) 100 UNIT/ML soln Sliding scale dosage, as per your routine 1 Month 0    Insulin Aspart (NOVOLOG SC)       insulin detemir (LEVEMIR FLEXTOUCH) 100 UNIT/ML pen Inject 50 Units Subcutaneous      insulin glargine (LANTUS SOLOSTAR) 100 UNIT/ML PEN 48 units at bedtime 1 Month 0    LOKELMA 10 g PACK packet       amLODIPine (NORVASC) 10 MG tablet Take 10 mg by mouth      BD NADYA U/F 32G X 4 MM insulin pen needle USE FOUR TIMES DAILY      blood glucose monitoring (ULTRA THIN 30G) lancets USE TO TEST FOUR TIMES DAILY AS DIRECTED.      Continuous Blood Gluc  (DEXCOM G6 ) VALENTIN FOR USE WITH CONTINUOUS GLUCOSE MONITORING SYSTEM.      Continuous Blood Gluc Sensor (DEXCOM G6 SENSOR) MISC CHANGE EVERY 10 DAYS      Continuous Blood Gluc Transmit (DEXCOM G6 TRANSMITTER) MISC CHANGE EVERY 3 MONTHS      HYDROcodone-acetaminophen (NORCO) 5-325 MG per tablet Take 1-2 tablets by mouth every 4 hours as needed for moderate to severe pain (Patient not taking: Reported on 1/18/2023) 15 tablet 0    insulin glargine (LANTUS VIAL) 100 UNIT/ML soln Inject Subcutaneous At Bedtime      LISINOPRIL PO  (Patient not taking: Reported on 8/16/2023)         Social History     Tobacco Use    Smoking status: Former     Packs/day: 0.50     Types: Cigarettes     Smokeless tobacco: Never   Substance Use Topics    Alcohol use: No    Drug use: Yes     Types: Marijuana       Maren Schmidt CMA  8/16/2023  1:15 PM

## 2023-08-16 NOTE — LETTER
8/16/2023       RE: Sugar Holland  [address]  [place] [address]     Dear Colleague,    Thank you for referring your patient, Sugar Holland, to the [facility] [place] at [facility]. Please see a copy of my visit note below.    Urology Oncology Clinic   Renal mass             Chief Complaint:   Cystic Renal Mass            Consult or Referral:     Sugar Hollnad is a 33 year old male seen in consultation.         History of Present Illness:    Sugar Holland is a very pleasant 33 year old male with history of ESRD is currently a transplant candidate.  Renal ultrasound showed a Bosniak 2 cyst with a thin septa therefore he was referred to me for further evaluation and clearance for his transplant surgery.  He still makes urine and has not been on dialysis yet.   he does not have a history of previous abdominal or retroperitoneal surgery.  There is not a family history of renal cell cancer.  The patient has a history of smoking and currently is not smoking.    ECOG Performance Score: 0 - Independent           Past Medical History:     Past Medical History:   Diagnosis Date    Diabetes (H)     Diabetic retinopathy (H)     Hypertension             Past Surgical History:     Past Surgical History:   Procedure Laterality Date    EYE SURGERY      ORTHOPEDIC SURGERY              Problem List:     Patient Active Problem List    Diagnosis Date Noted    Diabetes mellitus, type 2 (H) 01/25/2023     Priority: Medium    CKD (chronic kidney disease) stage 4, GFR 15-29 ml/min (H) 01/25/2023     Priority: Medium    History of tobacco use 01/25/2023     Priority: Medium    Renovascular hypertension 01/25/2023     Priority: Medium    Type 1 diabetes mellitus with nephropathy (H) 01/25/2023     Priority: Medium            Medications     Current Outpatient Medications   Medication    calcitRIOL (ROCALTROL) 0.25 MCG capsule    carvedilol (COREG)  25 MG tablet    cloNIDine (CATAPRES-TTS2) 0.2 MG/24HR WK patch    insulin aspart (NOVOLOG FLEXPEN) 100 UNIT/ML soln    Insulin Aspart (NOVOLOG SC)    insulin detemir (LEVEMIR FLEXTOUCH) 100 UNIT/ML pen    insulin glargine (LANTUS SOLOSTAR) 100 UNIT/ML PEN    LOKELMA 10 g PACK packet    amLODIPine (NORVASC) 10 MG tablet    BD NADYA U/F 32G X 4 MM insulin pen needle    blood glucose monitoring (ULTRA THIN 30G) lancets    Continuous Blood Gluc  (DEXCOM G6 ) VALENTIN    Continuous Blood Gluc Sensor (DEXCOM G6 SENSOR) MISC    Continuous Blood Gluc Transmit (DEXCOM G6 TRANSMITTER) MISC    HYDROcodone-acetaminophen (NORCO) 5-325 MG per tablet    insulin glargine (LANTUS VIAL) 100 UNIT/ML soln    LISINOPRIL PO     No current facility-administered medications for this visit.            Family History:     Family History   Problem Relation Age of Onset    Diabetes Mother     Heart Disease Maternal Grandmother     Colon Cancer Maternal Grandmother 52    Diabetes Maternal Aunt     Heart Disease Maternal Aunt     Diabetes Maternal Uncle     Heart Disease Maternal Uncle             Social History:     Social History     Socioeconomic History    Marital status: Single     Spouse name: Not on file    Number of children: Not on file    Years of education: Not on file    Highest education level: Not on file   Occupational History    Not on file   Tobacco Use    Smoking status: Former     Packs/day: 0.50     Types: Cigarettes    Smokeless tobacco: Never   Substance and Sexual Activity    Alcohol use: No    Drug use: Yes     Types: Marijuana    Sexual activity: Not on file   Other Topics Concern    Not on file   Social History Narrative    Not on file     Social Determinants of Health     Financial Resource Strain: Not on file   Food Insecurity: Not on file   Transportation Needs: Not on file   Physical Activity: Not on file   Stress: Not on file   Social Connections: Not on file   Intimate Partner Violence: Not on file    Housing Stability: Not on file            Allergies:   Patient has no known allergies.         Review of Systems:  From intake questionnaire     Negative 14 system review except as noted on HPI, nurse's note see below.         Physical Exam:     Patient is a 33 year old  male There is no height or weight on file to calculate BMI.  Constitutional: Vitals: Blood pressure (!) 149/91, pulse 90.  General Appearance Adult: Alert, no acute distress, oriented  HENT: throat/mouth:normal, good dentition  Neck: No adenopathy,masses or thyromegaly  Lungs/Repiratory: no respiratory distress, or pursed lip breathing  Heart: No obvious jugular venous distension present, normal heart rate  Abdomen: soft, nontender, no organomegaly or masses  Hematologic/Lymphatics: No cervical or supraclavicular adenopathy  Musculoskeltal: extremities normal, no peripheral edema  Skin: no noted suspicious lesions or rashes  Neuro: Alert, oriented, speech and mentation normal  Psych: affect and mood normal  Gait: Normal without assistance  : deferred        Imaging:    I personally viewed all applicable imaging and interpreted them and went over the results with the patient.     FINDINGS:     Right kidney: Measures 10.5 cm in length. Parenchyma is of normal  thickness and echogenicity. 1.4 x 1.4 x 1.6 cm renal cortex cyst  arising from the inferior pole with associated pain septation. No  hydronephrosis.     Left kidney: Measures 10.7 cm in length. Parenchyma is of normal  thickness and echogenicity. No focal mass. No hydronephrosis.      Bladder: Partially distended.                                                                      IMPRESSION:  1.  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  2.  No hydronephrosis     I have personally reviewed the examination and initial interpretation  and I agree with the findings.     LEATHA MOORE MD          Assessment and Plan:     Assessment:  33  year old male with ESRD candidate for kidney transplant with a Bosniak II renal lesion       We discussed the benign nature of the cyst and very small risk of malignancy close to zero. No contraindication for renal transplant based on this lesion.      Plan:  Follow-up in 1 yr with renal US       45 total minutes spent on the date of the encounter including direct interaction with the patient, performing chart review, documentation and further activities as noted above      Dandre Argueta MD   Department of Urology   [facility]

## 2023-08-16 NOTE — PATIENT INSTRUCTIONS
Follow up in one year with Homero Vu PA-C with an ultrasound first.    It was a pleasure meeting with you today.  Thank you for allowing me and my team the privilege of caring for you today.  YOU are the reason we are here, and I truly hope we provided you with the excellent service you deserve.  Please let us know if there is anything else we can do for you so that we can be sure you are leaving completely satisfied with your care experience.

## 2023-08-24 ENCOUNTER — TELEPHONE (OUTPATIENT)
Dept: TRANSPLANT | Facility: CLINIC | Age: 33
End: 2023-08-24
Payer: COMMERCIAL

## 2023-08-24 NOTE — TELEPHONE ENCOUNTER
Pt called for updates regarding his evaluation status.  We reviewed that eventually he will need coronary angiogram, but we will need to hold off on that until dialysis for now.  He recently saw Urology and will not need follow up for another year.  He states his mom, Silva Holland: [phone], will be his care-giver, I will reach out to SW to determine if they will need to come into clinic or can do a virtual visit to confirm that plan. Pt will let me know where he was seen for dental. He has completed the education videos, he asked that I call him at 10 AM tomorrow to review that information.

## 2023-08-25 ENCOUNTER — TELEPHONE (OUTPATIENT)
Dept: TRANSPLANT | Facility: CLINIC | Age: 33
End: 2023-08-25
Payer: COMMERCIAL

## 2023-08-25 ENCOUNTER — DOCUMENTATION ONLY (OUTPATIENT)
Dept: TRANSPLANT | Facility: CLINIC | Age: 33
End: 2023-08-25
Payer: COMMERCIAL

## 2023-08-25 NOTE — TELEPHONE ENCOUNTER
Called pt to review MTP teaching (see separate encounter). Pt reports he was seen at [facility] in [place] [phone], I will reach out to obtain a letter of clearance.  I let the pt know we will make arrangements for his social work follow up visit.  He understands he needs to work to lower his A1c for active listing status.  He had no further questions.    Called [facility] and left  requesting call back to direct line w/ fax number for dental letter to be sent.

## 2023-08-25 NOTE — PROGRESS NOTES
Kidney Transplant Waitlist - Qualified: 1/26/2023  Sugar Holland attended the pre-transplant patient education class today. The [facility] website pre-transplant modules were viewed; class participants were educated on using the site.     Content reviewed:  Living Donation and how to access that program  Paired exchange  Kidney Donor Profile Index (KDPI)  Waiting list issues (right to decline without penalty, high PHS risk donors, what to expect when called with an offer)  Hospital experience, length of stay, need to stay locally post-discharge (2-4 weeks)                       Post-surgery lifting and driving restrictions  Post-transplant routines, frequency of lab work and clinic visits  Need to stay locally post-discharge (2-4 weeks)  Role of Transplant Coordinator    Participants were informed of the benefits of transplant as well as potential risks such as infection, cancer, and death.  The need for total adherence with immunosuppression medications and following transplant regimens was stressed.  The overall evaluation/approval/listing process was reviewed.  Pt expressed good understanding.

## 2023-10-05 ENCOUNTER — TELEPHONE (OUTPATIENT)
Dept: TRANSPLANT | Facility: CLINIC | Age: 33
End: 2023-10-05
Payer: COMMERCIAL

## 2023-10-05 NOTE — TELEPHONE ENCOUNTER
Called [facility] and left VM requesting letter of clearance, faxed letter request to: [phone].    Called pt to touch base on evaluation status. Mailbox was full. Will need to address:    Follow up w/ SW and Mother (Silva) who will be care-giver  A1c  Angiogram once on HD

## 2023-10-05 NOTE — TELEPHONE ENCOUNTER
Received VM from Claudine at [facility].  She stated the pt had been referred to the [facility] periodontal for SRP and extractions.  She stated that the specialist will need to provide clearance. Her number is [phone].

## 2023-10-05 NOTE — LETTER
Sugar Holland  [address]  [place] [address]                October 5, 2023      Dear Dental Provider,     You may or may not know that the above patient in your care is in evaluation for an organ transplant. In order to be a candidate for transplant, our center requests that the patient provide a letter of clearance from their dentist stating that they are free from disease or infections. At your earliest convenience, please fax this information to [phone]. Your help is greatly appreciated.    Sincerely,   Darcy Ingram RN, [place], Transplant Coordinator  Solid Organ Transplant   [address] [place] [address]  [address]  [place] [address]  Phone [phone] Fax [phone]  [email or web address]

## 2023-10-19 ENCOUNTER — TELEPHONE (OUTPATIENT)
Dept: TRANSPLANT | Facility: CLINIC | Age: 33
End: 2023-10-19
Payer: COMMERCIAL

## 2023-10-19 DIAGNOSIS — Z76.82 ORGAN TRANSPLANT CANDIDATE: ICD-10-CM

## 2023-10-19 DIAGNOSIS — N18.9 CHRONIC KIDNEY DISEASE (CKD): Primary | ICD-10-CM

## 2023-10-19 DIAGNOSIS — E10.9 DIABETES MELLITUS TYPE 1 (H): ICD-10-CM

## 2023-10-19 NOTE — TELEPHONE ENCOUNTER
Called pt to touch base on evaluation status, he was referred to a periodontal specialist but was told they are not taking new patients, he needs to find a different specialist.  We discussed that he and his mom will need to come to the clinic together to meet w/ SW.  He would also like to discuss disability w/ SW at that visit.  He understands we will need a coronary angiogram once he is initiated on HD.  We also discussed that he should work on lowering his A1c to 8 or lower. SW order placed.  Pt had no further questions.

## 2023-11-10 ENCOUNTER — TELEPHONE (OUTPATIENT)
Dept: TRANSPLANT | Facility: CLINIC | Age: 33
End: 2023-11-10
Payer: COMMERCIAL

## 2023-11-10 DIAGNOSIS — N18.6 ESRD (END STAGE RENAL DISEASE) (H): ICD-10-CM

## 2023-11-10 DIAGNOSIS — E10.9 DIABETES MELLITUS TYPE 1 (H): Primary | ICD-10-CM

## 2023-11-10 DIAGNOSIS — Z76.82 ORGAN TRANSPLANT CANDIDATE: ICD-10-CM

## 2023-11-10 NOTE — TELEPHONE ENCOUNTER
Called pt to touch base w/ pt. He missed his visit w/ SW on 10/27/23 - he states he fell really ill for the past few weeks and then was admitted and initiated on dialysis.  I will ask our schedulers to help him reschedule that visit.  He understands his mom will need to attend w/ him.  He is now dialyzing at [facility] M/W/F.  We reviewed that he can now proceed w/ coronary angiogram - I will alert Cardiology.  We also discussed that he needs to get in to see his dentist and work to lower his A1c.  His last check on 11/8/23 was 11.5.  He expressed good understanding of the plan and had no further questions.

## 2023-11-14 DIAGNOSIS — Z76.82 PANCREAS TRANSPLANT CANDIDATE: Primary | ICD-10-CM

## 2023-11-14 DIAGNOSIS — Z01.818 ENCOUNTER FOR PRE-TRANSPLANT EVALUATION FOR KIDNEY AND PANCREAS TRANSPLANT: ICD-10-CM

## 2023-11-14 DIAGNOSIS — Z76.82 ORGAN TRANSPLANT CANDIDATE: ICD-10-CM

## 2023-11-14 DIAGNOSIS — Z76.82 KIDNEY TRANSPLANT CANDIDATE: ICD-10-CM

## 2023-11-14 DIAGNOSIS — Z01.810 PRE-OPERATIVE CARDIOVASCULAR EXAMINATION: ICD-10-CM

## 2023-11-14 RX ORDER — SODIUM CHLORIDE 9 MG/ML
INJECTION, SOLUTION INTRAVENOUS CONTINUOUS
OUTPATIENT
Start: 2023-11-14

## 2023-11-14 RX ORDER — POTASSIUM CHLORIDE 1500 MG/1
20 TABLET, EXTENDED RELEASE ORAL
OUTPATIENT
Start: 2023-11-14

## 2023-11-14 RX ORDER — ASPIRIN 325 MG
325 TABLET ORAL ONCE
OUTPATIENT
Start: 2023-11-14 | End: 2023-11-14

## 2023-11-14 RX ORDER — LIDOCAINE 40 MG/G
CREAM TOPICAL
OUTPATIENT
Start: 2023-11-14

## 2023-11-14 RX ORDER — ASPIRIN 81 MG/1
243 TABLET, CHEWABLE ORAL ONCE
OUTPATIENT
Start: 2023-11-14

## 2023-11-14 RX ORDER — POTASSIUM CHLORIDE 1500 MG/1
40 TABLET, EXTENDED RELEASE ORAL
OUTPATIENT
Start: 2023-11-14

## 2023-11-16 ENCOUNTER — DOCUMENTATION ONLY (OUTPATIENT)
Dept: TRANSPLANT | Facility: CLINIC | Age: 33
End: 2023-11-16
Payer: COMMERCIAL

## 2023-11-17 ENCOUNTER — TELEPHONE (OUTPATIENT)
Dept: TRANSPLANT | Facility: CLINIC | Age: 33
End: 2023-11-17
Payer: COMMERCIAL

## 2023-11-17 NOTE — TELEPHONE ENCOUNTER
Called pt and let him know that Cardiology is trying to reach him re Coronary Angiogram.  I let him know they have sent him information via the mail and he will need to reply to them once he receives it.  He agreed to so. I encouraged him to get on [employer] and have sent an updated code to activate his account. I let him know of upcoming  visit on 11/20/23 and reminded him his mom will need to attend, he said she has to work, therefore we will need to reschedule. I will reach out to scheduling and ask that they follow up with him. He had no further questions.

## 2023-12-27 ENCOUNTER — TELEPHONE (OUTPATIENT)
Dept: TRANSPLANT | Facility: CLINIC | Age: 33
End: 2023-12-27
Payer: COMMERCIAL

## 2023-12-27 NOTE — TELEPHONE ENCOUNTER
Called pt to discuss recent no-show w/ SW on 12/21/23. Left VM requesting call back to direct line. Per chart review appears pt has not made arrangements for coronary angiogram. Will discuss both w/ pt.

## 2024-01-04 ENCOUNTER — ALLIED HEALTH/NURSE VISIT (OUTPATIENT)
Dept: TRANSPLANT | Facility: CLINIC | Age: 34
End: 2024-01-04
Attending: PHYSICIAN ASSISTANT
Payer: COMMERCIAL

## 2024-01-04 DIAGNOSIS — E10.9 DIABETES MELLITUS TYPE 1 (H): ICD-10-CM

## 2024-01-04 DIAGNOSIS — Z76.82 ORGAN TRANSPLANT CANDIDATE: ICD-10-CM

## 2024-01-04 DIAGNOSIS — N18.6 ESRD (END STAGE RENAL DISEASE) (H): ICD-10-CM

## 2024-01-04 NOTE — PROGRESS NOTES
"Patient Name: Sugar Holland  : 1990  Age: 33 year old  MRN: [id number]  Date of Initial Social Work Evaluation: 2023 and 2023    Patient inactive on the transplant wait list.  Saw today to update psychosocial assessment.      Presenting Information   Living Situation: Sugar has moved since his last appointment- he now lives in a house with his sister. Address is below:  [address] [place] [address], [facility]    If not local, plans for short term stay:  N/A- local  Previous Functional Status: Sugar receives assistance with grooming, cooking, and cleaning as these have been difficult for him lately. He no longer drives and now obtains rides from his mother, sister, and [employer]. Sugar notes that he began dialysis in November. He dialyzes in [place] on a M/W/F basis. He feels this is going well and he attends all of his appointments and completes his runs.    Cultural/Language/Spiritual Considerations: N/A    Support System  Primary Support Person: Mother Kaitlynn ([phone])  Other support:  Sister who he lives with   Plan for support in immediate post-transplant period: Mother Kaitlynn and sister    Health Care Directive  Decision Maker: Self  Alternate Decision Maker: NOK- Parents   Health Care Directive: Provided education    Mental Health/Coping:   History of Mental Health: No major mental health history per last 2 SW assessments.   History of Chemical Health: No major chemical health history per last 2 SW assessments.   Current status: Sugar described his current state of mental health as \"okay\".   Coping: Coping appropriately given recent influx of stressors (moving and starting dialysis)  Services Needed/Recommended: None indicated at this time.     Financial   Income: [facility] cash assistance and SNAP. He has not worked since . Sugar is currently in the process of appealing his SSDI denial. Now that he has started dialysis, it is likely that this will be approved. "   Impact of transplant on income:  Impact on SSDI on year post, if obtained.    Insurance and medication coverage: [employer] ANDERSON. Dicussed that Sugar will be eligible for ESRD medicare in the next month of so due to dialysis. I encouraged him to speak with his dialysis SW about this.   Financial concerns: None noted  Resources needed: None at this time. Sugar will keep us posted of the status of his SSDI application and plans to speak with his dialysis SW about Medicare.     Assessment and recommendations and plan: Jaspreetclem, Kaitlynn, and I reviewed post transplant caregiver expectations. Kaitlynn reports she is willing and able to assist Sugar when the time comes. Kaitlynn reports her daughter, who lives with Sugar, will also be of assistance. A caregiver contract was provided today. Kaitlynn also plans to look into living donation on behalf of Sugar. In terms of finances/SSDI, Sugar completed a [employer] application and is now getting additional cash assistance. He has also obtained SNAP benefits. Sugar is in the process of appealing his SSDI denial and it is likely he will be eligible as he recently started dialysis. Sugar has adequately followed up on the SW recommendations provided during the last visit and appears to be an appropriate candidate at this time.  Reviewed transplant education (Medicare, rehabilitation, donor issues, community/financial resources, and psych/family adjustment) as well as psychosocial risks of transplant. Provided patient with a copy of post-transplant informational sheet that includes information on potential costs of medications, Medicare ESRD, post-transplant lodging, etc. Patient seemed to process information well. Appeared well informed, motivated, and able to follow post transplant requirements. Behavior was appropriate during interview. Has adequate income and insurance coverage. Adequate social support. No major contraindications noted for transplant. At this time, patient appears to  understand the risks and benefits of transplant.      ORTIZ Sanford, LICSW  SOT/BMT/CF Float

## 2024-01-05 ENCOUNTER — TELEPHONE (OUTPATIENT)
Dept: TRANSPLANT | Facility: CLINIC | Age: 34
End: 2024-01-05
Payer: COMMERCIAL

## 2024-01-05 NOTE — LETTER
Sugar BHATT Holland  [address]  [facility] [address]          January 5, 2024 11/16/2023      RE: Sugar Holland  [address]  [facility] [address]       RENUKA Wooten left you a voicemail on 11-16-23 letting you know that your cardiology and transplant teams have referred you for a diagnostic procedure called a Coronary Angiogram as part of your evaluation.    Please carefully read the information below, write down any questions on this letter, and keep letter somewhere easily retrievable.  Then call [phone] and select the option to 'send a message to your care team', tell the agent that you are requesting a call back from a nurse to discuss your Coronary Angiogram.  A nurse will call you back, typically within 2 business days.  During that conversation, any questions will be answered, and then we can proceed to scheduling.    Thank you,    Giles FINNEY, LPN  Cardiology Nurse Coordinator                                      Coronary Angiogram    A coronary angiogram is a procedure that uses X-ray imaging to see your heart's blood vessels. The test is generally done to see if there's a restriction in blood flow going to the heart.    Coronary angiograms are part of a general group of procedures known as heart (cardiac) catheterizations. Cardiac catheterization procedures can both diagnose and treat heart and blood vessel conditions. A coronary angiogram, which can help diagnose heart conditions, is the most common type of cardiac catheterization procedure.    During a coronary angiogram, a type of dye that's visible by an X-ray machine is injected into the blood vessels of your heart. The X-ray machine rapidly takes a series of images (angiograms), offering a look at your blood vessels. If necessary, your doctor can open clogged heart arteries (angioplasty) during your coronary angiogram.    When you arrive you will be escorted back to the pre procedure area. Here they will insert an IV, draw  labs, and obtain a short medical history. Please bring an updated list of your current medications.  A physician will come and talk with you about the procedure and obtain consent.  A nurse from the Cardiac Catheterization Lab will then escort you to the procedure area. You will be receiving sedation during the procedure so you will need someone to drive you home from the hospital and ensure that you arrive safely inside your home.    A small incision is made at the entry site- usually the neck, wrist, or groin, and a thin catheter inserted into your artery and carefully threaded to your heart or coronary arteries.  Dye (contrast material) is injected through the catheter. The dye is easy to see on X-ray images. As it moves through your blood vessels, your doctor can observe its flow and identify any blockages or constricted areas. Depending on what your doctor discovers during your angiogram, you may have additional catheter procedures at the same time, such as a stent placement to open up a narrowed artery.     Having an angiogram takes about one hour, although it may be longer, especially if combined with other cardiac catheterization procedures. Preparation and post-procedure care can add more time.  When the angiogram is over, the catheter is removed from your arm or groin and the incision is closed.    After the procedure you will recover for a short period (2 - 6 hours). You will be discharged with instructions for post procedure care. However, depending on what the angiogram shows you may need to have stents placed.  Very rarely, this may require an overnight stay. We ask that you bring a small bag of necessities for your comfort if you would need to stay overnight. Leave valuables at home.                          Pre-procedure instructions - Coronary Angiogram  Patient Education    Location is [address]  Room  Please plan on being at the hospital all day.  At any time, emergencies and/or urgent cases may come up which could delay the start of your procedure.    Pre-procedure instructions - Coronary Angiogram  Shower in the evening before or the morning of the procedure  No solid food for 8 hours prior and nothing to drink 2 hours prior to arrival time  You can take your morning medications (except for diabetic and blood thinners) with sips of water.  Take 325 mg of Asprin 24 hours prior to the procedure and the morning of procedure.   You will need to arrange a ride to drop you off and pick you up, as you will be unable to drive home.  Prior to discharge you may be required to lay flat for approximately 2-4 hours in the recovery unit to ensure proper clotting of the artery.              Diabetic Medication Instructions  Hold oral diabetic medication in morning of your procedure and for 48 hours after IV contrast is given  Typical instructions for insulin diabetic medication holding are below. However, please reach out to your Primary Care Provider or Endocrinologist for specific instructions  DO NOT take any oral diabetic medication, short-acting diabetes medications/insulin, humalog or regular insulin the morning of your test  Take   dose of long-acting insulin (Lantus, Levemir) the day of your test  Remember to bring your glucometer and insulin with you to take after your test if needed  DO NOT take injectable GLP-1 agonists semaglutide (Ozempic, Wegovy), dulaglutide (Trulicity), exenatide ER (Bydureon), tirzepatide (Mounjaro), or oral semaglutide (Rybelsus) for 7 days prior your procedure  Hold once daily injectable GLP-1 agonists exenatide (Byetta), liraglutide (Saxenda, Victoza) the day before and day of your procedure                  Anticoagulation Medication Instructions   NA    You will need to follow up with one of our cardiology APPs 1-2 weeks after your procedure. If you need help scheduling or  rescheduling your appointment, please call [phone]

## 2024-01-05 NOTE — TELEPHONE ENCOUNTER
Called pt to touch base on pre-SPK evaluation status, he saw SW yesterday but will still need to complete his coronary angiogram and update his dental work. He states he did not receive the letter from Cardiology, I will resend it to him now - I confirmed his address in [facility]. I provided him with the number to call for the angiogram. He expressed good understanding and had no further questions.

## 2024-02-14 ENCOUNTER — TELEPHONE (OUTPATIENT)
Dept: TRANSPLANT | Facility: CLINIC | Age: 34
End: 2024-02-14
Payer: COMMERCIAL

## 2024-02-14 NOTE — TELEPHONE ENCOUNTER
Called pt and left VM requesting call back to direct line to discuss evaluation status. Also provided phone number to schedule coronary angiogram.

## 2024-02-26 ENCOUNTER — TELEPHONE (OUTPATIENT)
Dept: TRANSPLANT | Facility: CLINIC | Age: 34
End: 2024-02-26
Payer: COMMERCIAL

## 2024-04-15 ENCOUNTER — TELEPHONE (OUTPATIENT)
Dept: TRANSPLANT | Facility: CLINIC | Age: 34
End: 2024-04-15
Payer: COMMERCIAL

## 2024-04-26 ENCOUNTER — TELEPHONE (OUTPATIENT)
Dept: TRANSPLANT | Facility: CLINIC | Age: 34
End: 2024-04-26
Payer: COMMERCIAL

## 2024-04-26 NOTE — TELEPHONE ENCOUNTER
Called pt and left VM requesting call back to direct line to discuss transplant evaluation status.

## 2024-05-31 ENCOUNTER — TELEPHONE (OUTPATIENT)
Dept: TRANSPLANT | Facility: CLINIC | Age: 34
End: 2024-05-31
Payer: COMMERCIAL

## 2024-05-31 NOTE — TELEPHONE ENCOUNTER
Called pt to discuss status of pre-SPK evaluation. Left VM requesting return call to direct line.     Called pt's dialysis unit, they endorse his compliance with dialysis. I let them know I have been trying to reach him and asked that they pass the message along, they agreed to do so.

## 2024-06-03 ENCOUNTER — TELEPHONE (OUTPATIENT)
Dept: TRANSPLANT | Facility: CLINIC | Age: 34
End: 2024-06-03
Payer: COMMERCIAL

## 2024-06-03 NOTE — TELEPHONE ENCOUNTER
Pt called regarding his pre-transplant evaluation status. I explained that he will need to call the Cardiology clinic to arrange for his coronary angiogram and provided him the number to do so. We also discussed that he needs to work to get his Hgb A1c under 8 prior to active listing status and will also need dental clearance.  He will let me know once he completes all dental work. He had no further questions.

## 2024-07-03 ENCOUNTER — TELEPHONE (OUTPATIENT)
Dept: TRANSPLANT | Facility: CLINIC | Age: 34
End: 2024-07-03
Payer: COMMERCIAL

## 2024-07-03 NOTE — TELEPHONE ENCOUNTER
Called pt to touch base on pre-SPK evaluation status. Left VM w/ phone number for coronary angiogram scheduling, explained we need to see Hgb A1c, and dental clearance as well. Provided my direct line for follow up questions.

## 2024-08-07 ENCOUNTER — TELEPHONE (OUTPATIENT)
Dept: TRANSPLANT | Facility: CLINIC | Age: 34
End: 2024-08-07
Payer: COMMERCIAL

## 2024-08-07 NOTE — TELEPHONE ENCOUNTER
Called pt and left VM requesting return call to direct line to discuss status of pre-SPK evaluation.    Called pt's dialysis unit and left VM for SW to discuss pt's evaluation status.

## 2024-08-08 ENCOUNTER — TELEPHONE (OUTPATIENT)
Dept: TRANSPLANT | Facility: CLINIC | Age: 34
End: 2024-08-08
Payer: COMMERCIAL

## 2024-08-08 ENCOUNTER — TRANSFERRED RECORDS (OUTPATIENT)
Dept: HEALTH INFORMATION MANAGEMENT | Facility: CLINIC | Age: 34
End: 2024-08-08
Payer: COMMERCIAL

## 2024-08-08 NOTE — TELEPHONE ENCOUNTER
Received return call from BERTRAND Mchugh at dialysis. She states his most recent Hgb A1c was 7.8 on 7/1/24. They report he is compliant w/ treatments and has an upcoming dental visit. They will encourage him to get his angiogram scheduled and will fax over his recent Hgb A1c.

## 2024-09-03 ENCOUNTER — TELEPHONE (OUTPATIENT)
Dept: TRANSPLANT | Facility: CLINIC | Age: 34
End: 2024-09-03
Payer: COMMERCIAL

## 2024-09-03 DIAGNOSIS — E10.9 TYPE 1 DIABETES MELLITUS (H): ICD-10-CM

## 2024-09-03 DIAGNOSIS — Z76.82 ORGAN TRANSPLANT CANDIDATE: ICD-10-CM

## 2024-09-03 DIAGNOSIS — N18.6 END STAGE RENAL DISEASE (H): ICD-10-CM

## 2024-09-03 DIAGNOSIS — Z01.818 ENCOUNTER FOR PRE-TRANSPLANT EVALUATION FOR KIDNEY AND PANCREAS TRANSPLANT: Primary | ICD-10-CM

## 2024-09-03 DIAGNOSIS — I10 ESSENTIAL HYPERTENSION: ICD-10-CM

## 2024-09-03 NOTE — TELEPHONE ENCOUNTER
Called pt and left VM requesting return call to direct line. He is due for updated renal US - order placed. Asked him to call Cardiology to schedule his coronary angiogram. Will need update on dental. He was seen in 1/2023 and therefore should have follow up w/ the transplant team. Will wait on Angio to schedule follow up visits.

## 2024-09-10 ENCOUNTER — ANCILLARY PROCEDURE (OUTPATIENT)
Dept: ULTRASOUND IMAGING | Facility: CLINIC | Age: 34
End: 2024-09-10
Attending: NURSE PRACTITIONER
Payer: COMMERCIAL

## 2024-09-10 DIAGNOSIS — E10.9 TYPE 1 DIABETES MELLITUS (H): ICD-10-CM

## 2024-09-10 DIAGNOSIS — Z76.82 ORGAN TRANSPLANT CANDIDATE: ICD-10-CM

## 2024-09-10 DIAGNOSIS — Z01.818 ENCOUNTER FOR PRE-TRANSPLANT EVALUATION FOR KIDNEY AND PANCREAS TRANSPLANT: ICD-10-CM

## 2024-09-10 DIAGNOSIS — I10 ESSENTIAL HYPERTENSION: ICD-10-CM

## 2024-09-10 DIAGNOSIS — N18.6 END STAGE RENAL DISEASE (H): ICD-10-CM

## 2024-09-10 PROCEDURE — 76770 US EXAM ABDO BACK WALL COMP: CPT | Mod: GC | Performed by: RADIOLOGY

## 2024-09-25 ENCOUNTER — TELEPHONE (OUTPATIENT)
Dept: TRANSPLANT | Facility: CLINIC | Age: 34
End: 2024-09-25
Payer: COMMERCIAL

## 2024-09-25 DIAGNOSIS — I15.0 RENOVASCULAR HYPERTENSION: Primary | ICD-10-CM

## 2024-09-25 DIAGNOSIS — E11.9 DIABETES MELLITUS, TYPE 2 (H): ICD-10-CM

## 2024-09-25 DIAGNOSIS — Z01.818 ENCOUNTER FOR PRE-TRANSPLANT EVALUATION FOR KIDNEY AND PANCREAS TRANSPLANT: ICD-10-CM

## 2024-09-25 DIAGNOSIS — Z01.810 PRE-OPERATIVE CARDIOVASCULAR EXAMINATION: ICD-10-CM

## 2024-09-25 DIAGNOSIS — Z76.82 ORGAN TRANSPLANT CANDIDATE: ICD-10-CM

## 2024-09-25 RX ORDER — ASPIRIN 325 MG
325 TABLET ORAL ONCE
OUTPATIENT
Start: 2024-09-25 | End: 2024-09-25

## 2024-09-25 RX ORDER — POTASSIUM CHLORIDE 1500 MG/1
40 TABLET, EXTENDED RELEASE ORAL
OUTPATIENT
Start: 2024-09-25

## 2024-09-25 RX ORDER — LIDOCAINE 40 MG/G
CREAM TOPICAL
OUTPATIENT
Start: 2024-09-25

## 2024-09-25 RX ORDER — SODIUM CHLORIDE 9 MG/ML
INJECTION, SOLUTION INTRAVENOUS CONTINUOUS
OUTPATIENT
Start: 2024-09-25

## 2024-09-25 RX ORDER — POTASSIUM CHLORIDE 1500 MG/1
20 TABLET, EXTENDED RELEASE ORAL
OUTPATIENT
Start: 2024-09-25

## 2024-09-25 RX ORDER — ASPIRIN 81 MG/1
243 TABLET, CHEWABLE ORAL ONCE
OUTPATIENT
Start: 2024-09-25

## 2024-09-25 NOTE — TELEPHONE ENCOUNTER
Called pt to touch base on pre-SPK evaluation. He stated that he attempted to schedule his coronary angiogram but was told there is no order in the system. I will reach out to Cardiology. He states he quit smoking about 2 months ago. He still needs to make a dental appointment- I encouraged him to do that. He had no further questions.

## 2024-10-24 ENCOUNTER — TELEPHONE (OUTPATIENT)
Dept: CARDIOLOGY | Facility: CLINIC | Age: 34
End: 2024-10-24
Payer: COMMERCIAL

## 2024-10-24 NOTE — LETTER
October 24, 2024      TO: Sugar Holland  [address]  [facility] [address]         Dear Sugar,    Pre-procedure instructions - Coronary Angiogram  Patient Education     Your arrival time is 11:30 Thursday, October 31.  Location is [address] [address] - [facility] Waiting Room  Please plan on being at the hospital all day.  At any time, emergencies and/or urgent cases may come up which could delay the start of your procedure.     Pre-procedure instructions - Coronary Angiogram  Shower in the evening before or the morning of the procedure  No solid food for 8 hours prior and nothing to drink 2 hours prior to arrival time  You can take your morning medications (except for diabetic and blood thinners) with sips of water.  Take 325 mg of Aspirin the night before and the morning of your procedure.  You will need to arrange a ride to drop you off and , as you will be unable to drive home. Prior to discharge you may be required to lay flat for approximately 2-6 hours in the recovery unit to ensure proper clotting of the artery. Please note: You cannot take an Uber/Taxi/etc unless you are accompanied by someone.You will need a responsible adult to stay with you for 24 hours post-procedure.               Diabetic Medication Instructions  Hold oral diabetic medication in morning of your procedure and for 48 hours after IV contrast is given  Typical instructions for insulin diabetic medication holding are below. However, please reach out to your Primary Care Provider or Endocrinologist for specific instructions  DO NOT take any oral diabetic medication, short-acting diabetes medications/insulin, humalog or regular insulin the morning of your test  Take   dose of long-acting insulin (Lantus, Levemir) the day of your test  Remember to bring your glucometer and insulin with you to take after your test if needed  GLP-1 Agonists Instructions  DO  NOT take injectable GLP-1 agonists semaglutide (Ozempic, Wegovy), dulaglutide (Trulicity), exenatide ER (Bydureon), tirzepatide (Mounjaro), or oral semaglutide (Rybelsus) for 7 days prior your procedure  Hold once daily injectable GLP-1 agonists exenatide (Byetta), liraglutide (Saxenda, Victoza), lixisenatide (Soligua) the day before and day of your procedure                  You will need to follow up with one of our cardiology APPs 1-2 weeks after your procedure. If you need help scheduling or rescheduling your appointment, please call [phone].  Someone from the transplant office will call you to set this up        Please do not hesitate to call me if you have any questions or concerns.    Sincerely,      Cielo Oliveira RN, BSN, CVN  Interventional Cardiology Coordinator  [phone]

## 2024-10-24 NOTE — TELEPHONE ENCOUNTER
Pre-procedure instructions - Coronary Angiogram  Patient Education    Your arrival time is 11:30 Thursday, October 31.  Location is [address] Waiting Room  Please plan on being at the hospital all day.  At any time, emergencies and/or urgent cases may come up which could delay the start of your procedure.    Pre-procedure instructions - Coronary Angiogram  Shower in the evening before or the morning of the procedure  No solid food for 8 hours prior and nothing to drink 2 hours prior to arrival time  You can take your morning medications (except for diabetic and blood thinners) with sips of water.  Take 325 mg of Aspirin the night before and the morning of your procedure.  You will need to arrange a ride to drop you off and , as you will be unable to drive home. Prior to discharge you may be required to lay flat for approximately 2-6 hours in the recovery unit to ensure proper clotting of the artery. Please note: You cannot take an Uber/Taxi/etc unless you are accompanied by someone.You will need a responsible adult to stay with you for 24 hours post-procedure.              Diabetic Medication Instructions  Hold oral diabetic medication in morning of your procedure and for 48 hours after IV contrast is given  Typical instructions for insulin diabetic medication holding are below. However, please reach out to your Primary Care Provider or Endocrinologist for specific instructions  DO NOT take any oral diabetic medication, short-acting diabetes medications/insulin, humalog or regular insulin the morning of your test  Take   dose of long-acting insulin (Lantus, Levemir) the day of your test  Remember to bring your glucometer and insulin with you to take after your test if needed  GLP-1 Agonists Instructions  DO NOT take injectable GLP-1 agonists semaglutide (Ozempic, Wegovy), dulaglutide (Trulicity), exenatide ER  (Bydureon), tirzepatide (Mounjaro), or oral semaglutide (Rybelsus) for 7 days prior your procedure  Hold once daily injectable GLP-1 agonists exenatide (Byetta), liraglutide (Saxenda, Victoza), lixisenatide (Soligua) the day before and day of your procedure               You will need to follow up with one of our cardiology APPs 1-2 weeks after your procedure. If you need help scheduling or rescheduling your appointment, please call [phone]     Patient states he understands and agrees to the procedure.

## 2024-10-25 ENCOUNTER — TELEPHONE (OUTPATIENT)
Dept: TRANSPLANT | Facility: CLINIC | Age: 34
End: 2024-10-25
Payer: COMMERCIAL

## 2024-10-25 NOTE — TELEPHONE ENCOUNTER
Patient confirmed scheduled appointment:  Date: 11/20  Time: 8:30 am  Visit type: K/P Eval Return  Provider: Lesley  Location: [facility]  Testing/imaging: NA  Additional notes: NA

## 2024-10-29 ENCOUNTER — TELEPHONE (OUTPATIENT)
Dept: TRANSPLANT | Facility: CLINIC | Age: 34
End: 2024-10-29
Payer: COMMERCIAL

## 2024-10-29 DIAGNOSIS — Z01.810 PRE-OPERATIVE CARDIOVASCULAR EXAMINATION: ICD-10-CM

## 2024-10-29 DIAGNOSIS — E10.9 DIABETES MELLITUS TYPE 1 (H): Primary | ICD-10-CM

## 2024-10-29 DIAGNOSIS — I10 HYPERTENSION: ICD-10-CM

## 2024-10-29 DIAGNOSIS — Z76.82 ORGAN TRANSPLANT CANDIDATE: ICD-10-CM

## 2024-10-29 DIAGNOSIS — N18.6 ESRD (END STAGE RENAL DISEASE) (H): ICD-10-CM

## 2024-10-29 NOTE — TELEPHONE ENCOUNTER
Called pt to touch base on pre-kidney/pancreas transplant status. Left VM requesting return call to direct line.

## 2024-11-18 ENCOUNTER — TELEPHONE (OUTPATIENT)
Dept: TRANSPLANT | Facility: CLINIC | Age: 34
End: 2024-11-18
Payer: COMMERCIAL

## 2024-11-18 ENCOUNTER — TELEPHONE (OUTPATIENT)
Dept: CARDIOLOGY | Facility: CLINIC | Age: 34
End: 2024-11-18
Payer: COMMERCIAL

## 2024-11-18 NOTE — TELEPHONE ENCOUNTER
Patient Contacted for the patient to call back and schedule the following:    Appointment type: K/P eval return neph   Provider: HUMBLE Monroy CNP   Return date: 12/4/24  Specialty phone number: [phone]  Additional appointment(s) needed: n/a   Additonal Notes: Writer contacted pt to schedule 2 wk post angiogram follow up with HUMBLE Monroy CNP on 12/4/24 @8:30a.   AL (Noor) 11/18/24

## 2024-11-18 NOTE — TELEPHONE ENCOUNTER
Pre-procedure instructions - Coronary Angiogram  Patient Education    Your arrival time is 10;30 Thursday, November 21.  Location is [address] Waiting Room  Please plan on being at the hospital all day.  At any time, emergencies and/or urgent cases may come up which could delay the start of your procedure.    Pre-procedure instructions - Coronary Angiogram  Shower in the evening before or the morning of the procedure  No solid food for 8 hours prior and nothing to drink 2 hours prior to arrival time  You can take your morning medications (except for diabetic and blood thinners) with sips of water.  Take 325 mg of Aspirin the night before and the morning of your procedure.  You will need to arrange a ride to drop you off and , as you will be unable to drive home. Prior to discharge you may be required to lay flat for approximately 2-6 hours in the recovery unit to ensure proper clotting of the artery. Please note: You cannot take an Uber/Taxi/etc unless you are accompanied by someone.You will need a responsible adult to stay with you for 24 hours post-procedure.              Diabetic Medication Instructions  Hold oral diabetic medication in morning of your procedure and for 48 hours after IV contrast is given  Typical instructions for insulin diabetic medication holding are below. However, please reach out to your Primary Care Provider or Endocrinologist for specific instructions  DO NOT take any oral diabetic medication, short-acting diabetes medications/insulin, humalog or regular insulin the morning of your test  Take   dose of long-acting insulin (Lantus, Levemir) the day of your test  Remember to bring your glucometer and insulin with you to take after your test if needed  GLP-1 Agonists Instructions  DO NOT take injectable GLP-1 agonists semaglutide (Ozempic, Wegovy), dulaglutide (Trulicity), exenatide ER  (Bydureon), tirzepatide (Mounjaro), or oral semaglutide (Rybelsus) for 7 days prior your procedure  Hold once daily injectable GLP-1 agonists exenatide (Byetta), liraglutide (Saxenda, Victoza), lixisenatide (Soligua) the day before and day of your procedure                  Anticoagulation Medication Instructions   NA  Write N/A if not currently taking    You will need to follow up with one of our cardiology APPs 1-2 weeks after your procedure. If you need help scheduling or rescheduling your appointment, please call [phone]     Patient states he understands and agrees to the instructions and prep

## 2024-11-21 ENCOUNTER — APPOINTMENT (OUTPATIENT)
Dept: MEDSURG UNIT | Facility: CLINIC | Age: 34
End: 2024-11-21
Attending: INTERNAL MEDICINE
Payer: COMMERCIAL

## 2024-11-21 ENCOUNTER — HOSPITAL ENCOUNTER (OUTPATIENT)
Facility: CLINIC | Age: 34
Discharge: HOME | End: 2024-11-21
Attending: INTERNAL MEDICINE | Admitting: INTERNAL MEDICINE
Payer: COMMERCIAL

## 2024-11-21 ENCOUNTER — APPOINTMENT (OUTPATIENT)
Dept: LAB | Facility: CLINIC | Age: 34
End: 2024-11-21
Attending: INTERNAL MEDICINE
Payer: COMMERCIAL

## 2024-11-21 VITALS
RESPIRATION RATE: 16 BRPM | WEIGHT: 195.7 LBS | TEMPERATURE: 97.8 F | HEART RATE: 104 BPM | OXYGEN SATURATION: 100 % | DIASTOLIC BLOOD PRESSURE: 96 MMHG | SYSTOLIC BLOOD PRESSURE: 167 MMHG | BODY MASS INDEX: 31.08 KG/M2

## 2024-11-21 DIAGNOSIS — E11.9 DIABETES MELLITUS, TYPE 2 (H): ICD-10-CM

## 2024-11-21 DIAGNOSIS — Z76.82 ORGAN TRANSPLANT CANDIDATE: ICD-10-CM

## 2024-11-21 DIAGNOSIS — Z01.818 ENCOUNTER FOR PRE-TRANSPLANT EVALUATION FOR KIDNEY AND PANCREAS TRANSPLANT: ICD-10-CM

## 2024-11-21 DIAGNOSIS — I15.0 RENOVASCULAR HYPERTENSION: ICD-10-CM

## 2024-11-21 DIAGNOSIS — Z01.810 PRE-OPERATIVE CARDIOVASCULAR EXAMINATION: ICD-10-CM

## 2024-11-21 PROBLEM — Z98.890 STATUS POST CORONARY ANGIOGRAM: Status: ACTIVE | Noted: 2024-11-21

## 2024-11-21 LAB
ANION GAP SERPL CALCULATED.3IONS-SCNC: 14 MMOL/L (ref 7–15)
APTT PPP: 28 SECONDS (ref 22–38)
ATRIAL RATE - MUSE: 84 BPM
BUN SERPL-MCNC: 31.6 MG/DL (ref 6–20)
CALCIUM SERPL-MCNC: 8.7 MG/DL (ref 8.8–10.4)
CHLORIDE SERPL-SCNC: 96 MMOL/L (ref 98–107)
CREAT SERPL-MCNC: 10.9 MG/DL (ref 0.67–1.17)
DIASTOLIC BLOOD PRESSURE - MUSE: NORMAL MMHG
EGFRCR SERPLBLD CKD-EPI 2021: 6 ML/MIN/1.73M2
ERYTHROCYTE [DISTWIDTH] IN BLOOD BY AUTOMATED COUNT: 15.8 % (ref 10–15)
GLUCOSE BLDC GLUCOMTR-MCNC: 110 MG/DL (ref 70–99)
GLUCOSE BLDC GLUCOMTR-MCNC: 117 MG/DL (ref 70–99)
GLUCOSE BLDC GLUCOMTR-MCNC: 153 MG/DL (ref 70–99)
GLUCOSE BLDC GLUCOMTR-MCNC: 39 MG/DL (ref 70–99)
GLUCOSE BLDC GLUCOMTR-MCNC: 52 MG/DL (ref 70–99)
GLUCOSE BLDC GLUCOMTR-MCNC: 75 MG/DL (ref 70–99)
GLUCOSE SERPL-MCNC: 146 MG/DL (ref 70–99)
HCO3 SERPL-SCNC: 33 MMOL/L (ref 22–29)
HCT VFR BLD AUTO: 36.4 % (ref 40–53)
HGB BLD-MCNC: 11.8 G/DL (ref 13.3–17.7)
INR PPP: 1.02 (ref 0.85–1.15)
INTERPRETATION ECG - MUSE: NORMAL
MCH RBC QN AUTO: 29.3 PG (ref 26.5–33)
MCHC RBC AUTO-ENTMCNC: 32.4 G/DL (ref 31.5–36.5)
MCV RBC AUTO: 90 FL (ref 78–100)
P AXIS - MUSE: 28 DEGREES
PLATELET # BLD AUTO: 216 10E3/UL (ref 150–450)
POTASSIUM SERPL-SCNC: 4.5 MMOL/L (ref 3.4–5.3)
PR INTERVAL - MUSE: 164 MS
QRS DURATION - MUSE: 76 MS
QT - MUSE: 404 MS
QTC - MUSE: 477 MS
R AXIS - MUSE: -22 DEGREES
RBC # BLD AUTO: 4.03 10E6/UL (ref 4.4–5.9)
SODIUM SERPL-SCNC: 143 MMOL/L (ref 135–145)
SYSTOLIC BLOOD PRESSURE - MUSE: NORMAL MMHG
T AXIS - MUSE: 147 DEGREES
VENTRICULAR RATE- MUSE: 84 BPM
WBC # BLD AUTO: 6 10E3/UL (ref 4–11)

## 2024-11-21 PROCEDURE — 258N000001 HC RX 258: Performed by: INTERNAL MEDICINE

## 2024-11-21 PROCEDURE — 250N000011 HC RX IP 250 OP 636

## 2024-11-21 PROCEDURE — 80048 BASIC METABOLIC PNL TOTAL CA: CPT | Performed by: INTERNAL MEDICINE

## 2024-11-21 PROCEDURE — 85730 THROMBOPLASTIN TIME PARTIAL: CPT | Performed by: INTERNAL MEDICINE

## 2024-11-21 PROCEDURE — 999N000134 HC STATISTIC PP CARE STAGE 3

## 2024-11-21 PROCEDURE — 258N000001 HC RX 258

## 2024-11-21 PROCEDURE — 93454 CORONARY ARTERY ANGIO S&I: CPT | Performed by: INTERNAL MEDICINE

## 2024-11-21 PROCEDURE — 250N000009 HC RX 250: Performed by: INTERNAL MEDICINE

## 2024-11-21 PROCEDURE — 82374 ASSAY BLOOD CARBON DIOXIDE: CPT | Performed by: INTERNAL MEDICINE

## 2024-11-21 PROCEDURE — 99152 MOD SED SAME PHYS/QHP 5/>YRS: CPT | Mod: GC | Performed by: INTERNAL MEDICINE

## 2024-11-21 PROCEDURE — 82962 GLUCOSE BLOOD TEST: CPT

## 2024-11-21 PROCEDURE — 99207 PR NO CHARGE LOS: CPT

## 2024-11-21 PROCEDURE — 93454 CORONARY ARTERY ANGIO S&I: CPT | Mod: 26 | Performed by: INTERNAL MEDICINE

## 2024-11-21 PROCEDURE — 999N000208 HC STATISTIC IV PUSH SINGLE INITIAL SUBSTANCE

## 2024-11-21 PROCEDURE — 36415 COLL VENOUS BLD VENIPUNCTURE: CPT | Performed by: INTERNAL MEDICINE

## 2024-11-21 PROCEDURE — 999N000054 HC STATISTIC EKG NON-CHARGEABLE

## 2024-11-21 PROCEDURE — 93005 ELECTROCARDIOGRAM TRACING: CPT

## 2024-11-21 PROCEDURE — 250N000013 HC RX MED GY IP 250 OP 250 PS 637: Performed by: STUDENT IN AN ORGANIZED HEALTH CARE EDUCATION/TRAINING PROGRAM

## 2024-11-21 PROCEDURE — 272N000001 HC OR GENERAL SUPPLY STERILE: Performed by: INTERNAL MEDICINE

## 2024-11-21 PROCEDURE — 999N000142 HC STATISTIC PROCEDURE PREP ONLY

## 2024-11-21 PROCEDURE — 96375 TX/PRO/DX INJ NEW DRUG ADDON: CPT | Mod: XU

## 2024-11-21 PROCEDURE — 250N000013 HC RX MED GY IP 250 OP 250 PS 637

## 2024-11-21 PROCEDURE — 99153 MOD SED SAME PHYS/QHP EA: CPT | Performed by: INTERNAL MEDICINE

## 2024-11-21 PROCEDURE — 250N000011 HC RX IP 250 OP 636: Performed by: INTERNAL MEDICINE

## 2024-11-21 PROCEDURE — 85610 PROTHROMBIN TIME: CPT | Performed by: INTERNAL MEDICINE

## 2024-11-21 PROCEDURE — 250N000013 HC RX MED GY IP 250 OP 250 PS 637: Performed by: INTERNAL MEDICINE

## 2024-11-21 PROCEDURE — 99152 MOD SED SAME PHYS/QHP 5/>YRS: CPT | Performed by: INTERNAL MEDICINE

## 2024-11-21 PROCEDURE — C1894 INTRO/SHEATH, NON-LASER: HCPCS | Performed by: INTERNAL MEDICINE

## 2024-11-21 PROCEDURE — 85014 HEMATOCRIT: CPT | Performed by: INTERNAL MEDICINE

## 2024-11-21 RX ORDER — ATROPINE SULFATE 0.1 MG/ML
0.5 INJECTION INTRAVENOUS
Status: DISCONTINUED | OUTPATIENT
Start: 2024-11-21 | End: 2024-11-21 | Stop reason: HOSPADM

## 2024-11-21 RX ORDER — HYDRALAZINE HYDROCHLORIDE 50 MG/1
50 TABLET, FILM COATED ORAL ONCE
Status: COMPLETED | OUTPATIENT
Start: 2024-11-21 | End: 2024-11-21

## 2024-11-21 RX ORDER — POTASSIUM CHLORIDE 750 MG/1
40 TABLET, EXTENDED RELEASE ORAL
Status: DISCONTINUED | OUTPATIENT
Start: 2024-11-21 | End: 2024-11-21 | Stop reason: HOSPADM

## 2024-11-21 RX ORDER — NICOTINE POLACRILEX 4 MG
15-30 LOZENGE BUCCAL
Status: DISCONTINUED | OUTPATIENT
Start: 2024-11-21 | End: 2024-11-21 | Stop reason: HOSPADM

## 2024-11-21 RX ORDER — NALOXONE HYDROCHLORIDE 0.4 MG/ML
0.4 INJECTION, SOLUTION INTRAMUSCULAR; INTRAVENOUS; SUBCUTANEOUS
Status: DISCONTINUED | OUTPATIENT
Start: 2024-11-21 | End: 2024-11-21 | Stop reason: HOSPADM

## 2024-11-21 RX ORDER — DEXTROSE MONOHYDRATE 25 G/50ML
25-50 INJECTION, SOLUTION INTRAVENOUS
Status: DISCONTINUED | OUTPATIENT
Start: 2024-11-21 | End: 2024-11-21 | Stop reason: HOSPADM

## 2024-11-21 RX ORDER — NALOXONE HYDROCHLORIDE 0.4 MG/ML
0.2 INJECTION, SOLUTION INTRAMUSCULAR; INTRAVENOUS; SUBCUTANEOUS
Status: DISCONTINUED | OUTPATIENT
Start: 2024-11-21 | End: 2024-11-21 | Stop reason: HOSPADM

## 2024-11-21 RX ORDER — ACETAMINOPHEN 325 MG/1
650 TABLET ORAL EVERY 4 HOURS PRN
Status: DISCONTINUED | OUTPATIENT
Start: 2024-11-21 | End: 2024-11-21 | Stop reason: HOSPADM

## 2024-11-21 RX ORDER — DEXTROSE MONOHYDRATE 25 G/50ML
INJECTION, SOLUTION INTRAVENOUS
Status: DISCONTINUED | OUTPATIENT
Start: 2024-11-21 | End: 2024-11-21 | Stop reason: HOSPADM

## 2024-11-21 RX ORDER — POTASSIUM CHLORIDE 750 MG/1
20 TABLET, EXTENDED RELEASE ORAL
Status: DISCONTINUED | OUTPATIENT
Start: 2024-11-21 | End: 2024-11-21 | Stop reason: HOSPADM

## 2024-11-21 RX ORDER — ASPIRIN 81 MG/1
243 TABLET, CHEWABLE ORAL ONCE
Status: COMPLETED | OUTPATIENT
Start: 2024-11-21 | End: 2024-11-21

## 2024-11-21 RX ORDER — HYDRALAZINE HYDROCHLORIDE 20 MG/ML
INJECTION INTRAMUSCULAR; INTRAVENOUS
Status: DISCONTINUED | OUTPATIENT
Start: 2024-11-21 | End: 2024-11-21 | Stop reason: HOSPADM

## 2024-11-21 RX ORDER — FENTANYL CITRATE 50 UG/ML
INJECTION, SOLUTION INTRAMUSCULAR; INTRAVENOUS
Status: DISCONTINUED | OUTPATIENT
Start: 2024-11-21 | End: 2024-11-21 | Stop reason: HOSPADM

## 2024-11-21 RX ORDER — OXYCODONE HYDROCHLORIDE 10 MG/1
10 TABLET ORAL EVERY 4 HOURS PRN
Status: DISCONTINUED | OUTPATIENT
Start: 2024-11-21 | End: 2024-11-21 | Stop reason: HOSPADM

## 2024-11-21 RX ORDER — CARVEDILOL 25 MG/1
25 TABLET ORAL ONCE
Status: COMPLETED | OUTPATIENT
Start: 2024-11-21 | End: 2024-11-21

## 2024-11-21 RX ORDER — HYDRALAZINE HYDROCHLORIDE 20 MG/ML
10 INJECTION INTRAMUSCULAR; INTRAVENOUS EVERY 4 HOURS PRN
Status: DISCONTINUED | OUTPATIENT
Start: 2024-11-21 | End: 2024-11-21 | Stop reason: HOSPADM

## 2024-11-21 RX ORDER — OXYCODONE HYDROCHLORIDE 5 MG/1
5 TABLET ORAL EVERY 4 HOURS PRN
Status: DISCONTINUED | OUTPATIENT
Start: 2024-11-21 | End: 2024-11-21 | Stop reason: HOSPADM

## 2024-11-21 RX ORDER — FLUMAZENIL 0.1 MG/ML
0.2 INJECTION, SOLUTION INTRAVENOUS
Status: DISCONTINUED | OUTPATIENT
Start: 2024-11-21 | End: 2024-11-21 | Stop reason: HOSPADM

## 2024-11-21 RX ORDER — ASPIRIN 325 MG
325 TABLET ORAL ONCE
Status: COMPLETED | OUTPATIENT
Start: 2024-11-21 | End: 2024-11-21

## 2024-11-21 RX ORDER — FENTANYL CITRATE 50 UG/ML
25 INJECTION, SOLUTION INTRAMUSCULAR; INTRAVENOUS
Status: DISCONTINUED | OUTPATIENT
Start: 2024-11-21 | End: 2024-11-21 | Stop reason: HOSPADM

## 2024-11-21 RX ORDER — SODIUM CHLORIDE 9 MG/ML
INJECTION, SOLUTION INTRAVENOUS CONTINUOUS
Status: DISCONTINUED | OUTPATIENT
Start: 2024-11-21 | End: 2024-11-21 | Stop reason: HOSPADM

## 2024-11-21 RX ORDER — LIDOCAINE 40 MG/G
CREAM TOPICAL
Status: DISCONTINUED | OUTPATIENT
Start: 2024-11-21 | End: 2024-11-21 | Stop reason: HOSPADM

## 2024-11-21 RX ORDER — IOPAMIDOL 755 MG/ML
INJECTION, SOLUTION INTRAVASCULAR
Status: DISCONTINUED | OUTPATIENT
Start: 2024-11-21 | End: 2024-11-21 | Stop reason: HOSPADM

## 2024-11-21 RX ADMIN — DEXTROSE MONOHYDRATE 25 ML: 25 INJECTION, SOLUTION INTRAVENOUS at 12:48

## 2024-11-21 RX ADMIN — ASPIRIN 325 MG ORAL TABLET 325 MG: 325 PILL ORAL at 11:09

## 2024-11-21 RX ADMIN — HYDRALAZINE HYDROCHLORIDE 50 MG: 50 TABLET ORAL at 17:03

## 2024-11-21 RX ADMIN — HYDRALAZINE HYDROCHLORIDE 10 MG: 20 INJECTION INTRAMUSCULAR; INTRAVENOUS at 16:02

## 2024-11-21 RX ADMIN — CARVEDILOL 25 MG: 25 TABLET, FILM COATED ORAL at 17:03

## 2024-11-21 RX ADMIN — DEXTROSE MONOHYDRATE 25 ML: 25 INJECTION, SOLUTION INTRAVENOUS at 13:08

## 2024-11-21 RX ADMIN — ACETAMINOPHEN 650 MG: 325 TABLET, FILM COATED ORAL at 16:32

## 2024-11-21 ASSESSMENT — ACTIVITIES OF DAILY LIVING (ADL)
ADLS_ACUITY_SCORE: 0

## 2024-11-21 NOTE — PRE-PROCEDURE
GENERAL PRE-PROCEDURE:   Procedure:  Coronary angiogram with possible percutaneous coronary intervention  Date/Time:  11/21/2024 12:22 PM    Verbal consent obtained?: Yes    Written consent obtained?: Yes    Risks and benefits: Risks, benefits and alternatives were discussed    DC Plan: Appropriate discharge home plan in place for patients who are going home after procedure   Consent given by:  Patient  Patient states understanding of procedure being performed: Yes    Patient's understanding of procedure matches consent: Yes    Procedure consent matches procedure scheduled: Yes    Expected level of sedation:  Moderate  Appropriately NPO:  Yes  ASA Class:  4  Mallampati  :  Grade 2- soft palate, base of uvula, tonsillar pillars, and portion of posterior pharyngeal wall visible  Lungs:  Lungs clear with good breath sounds bilaterally  Heart:  Normal heart sounds and rate  History & Physical reviewed:  Abbreviated history and physical done prior to moderate sedation  Statement of review:  I have reviewed the lab findings, diagnostic data, medications, and the plan for sedation

## 2024-11-21 NOTE — Clinical Note
BG 39. Procedural MD aware, ordered 50mL of 50% dextrose IVP. Need for recheck communicated to post-procedural area.

## 2024-11-21 NOTE — PROGRESS NOTES
D/I/A:  Patient is tolerating liquids and foods, ambulating, urinating, puncture sites are stable ( no bleeding and no hematoma) and patient has a .  A+O x4 and making needs known.  CCL access sites C/D/I; no bleeding or hematoma; CMS intact.  VSSA.  SR on monitor.  IV access removed.  Education completed and outlined in AVS or handout: medications reviewed with patient.  Questions answered prior to discharge.  Belongings returned to patient at discharge.    Pt BP under 170s systolic and is ok to discontinue per Jesica BEAVER. Pt was given carvedilol and hydralazine PO prior to leaving per MD Brad. Pt stated he did not take his normal BP meds today.  P: Discharged to self care.  Patient to follow up with appts as per discharge instruction.

## 2024-11-21 NOTE — PROGRESS NOTES
Pt prepped for CORS. Groins prepped, pulses marked, PIV in place in LUE. Pt was dennise aspirin upon admission to . Consent needs to be signed. Labs resulted. Girlfriend will be ride home. Pt BG was 146 at 1031 this morning.

## 2024-11-21 NOTE — Clinical Note
dry, intact, no bleeding and no hematoma. 4fr sheath pulled from RFA. Manual pressure held to hemostasis. Dressing applied.

## 2024-11-21 NOTE — H&P
History and Physical: Cardiology Cath Lab Service    Sugar Holland MRN# [id number]   YOB: 1990 Age: 34 year old         Assessment and plan:   Sugar Holland is a 34 year old male with a history of poorly controlled DM1, obesity, ESRD on HD, and tobacco use.    # ESRD on HD  # Pre op evaluation for kidney/panc transplant  Patient presenting today for cardiovascular evaluation prior to kidney/pancreas transplant.  CKD is likely due to diabetic nephropathy.  Patient has been type I diabetis since age 13. He has reported being very sedentary.  He walks maybe 10 minutes every other day. He reports improvement in his symptoms after starting HD. Today he reports feeling well and denies any symptoms.  - Questions answered to patient's satisfaction and consent signed.  - No contraindications noted, will move forward with the procedure(s).  - Patient will be admitted as OP to Obs unit post procedure, with plans to discharge home after post procedure orders have been met      Jesica Heart PA-C  [facility] Cardiology Cath Lab Services  [phone]    HPI:   Sugar Holland is a 34 year old male with a history of poorly controlled DM1, obesity, ESRD on HD, and tobacco use.    Patient presenting today for cardiovascular evaluation prior to kidney/pancreas transplant.  CKD is likely due to diabetic nephropathy.  Patient has been type I diabetis since age 13.  He has reported being very sedentary.  He walks maybe 10 minutes every other day.  He is able to climb the stairs at home.  No chest pain, shortness of breath, dizziness, syncope or lower extremity edema.  He does not monitor his blood pressure at home.    Patient reports feeling well today. No chest pain, dyspnea, lower extremity edema, dizziness, lightheadedness, palpitations, presyncope, or syncope. No recent illness, fevers, chills, nausea, vomiting, diarrhea, dysuria, or abdominal pain. No headache, visual changes, numbness, tingling, or weakness. No  bleeding or clotting problems.     Past Medical History:   Diagnosis Date    Diabetes (H)     Diabetic retinopathy (H)     Hypertension        Past Surgical History:   Procedure Laterality Date    EYE SURGERY      IR CVC TUNNEL W2 CATH W/O PORT  11/5/2023    IR DIALYSIS FISTULOGRAM RIGHT  7/16/2024    ORTHOPEDIC SURGERY         Current Facility-Administered Medications   Medication Dose Route Frequency Provider Last Rate Last Admin    aspirin (ASA) tablet 325 mg  325 mg Oral Once Soraya Quintanilla MD        Or    aspirin (ASA) chewable tablet 243 mg  243 mg Oral Once Soraya Quintanilla MD        Give 1/2 (50%) dose of long acting insulin. Insulin glargine (LANTUS, TOUJEO) or insulin detemir (LEVEMIR) the morning of the cardiac procedure   Does not apply Continuous PRN Soraya Quintanilla MD        HOLD: All rapid/short acting insulin (aspart, lispro, regular)   Does not apply HOLD Soraya Quintanilla MD        lidocaine (LMX4) cream   Topical Q1H PRN Soraya Quintanilla MD        lidocaine 1 % 0.1-1 mL  0.1-1 mL Other Q1H PRN Soraya Quintanilla MD        Patient is NOT allergic to contrast dye   Does not apply DOES NOT GO TO MAR Soraya Quintanilla MD        potassium chloride thaddeus ER (KLOR-CON M10) CR tablet 20 mEq  20 mEq Oral Once PRN Soraya Quintanilla MD        potassium chloride thaddeus ER (KLOR-CON M10) CR tablet 40 mEq  40 mEq Oral Once PRN Soraya Quintanilla MD        sodium chloride (PF) 0.9% PF flush 3 mL  3 mL Intracatheter Q8H Soraya Quintanilla MD        sodium chloride (PF) 0.9% PF flush 3 mL  3 mL Intracatheter Q1H PRN Soraya Quintanilla MD        sodium chloride 0.9 % infusion   Intravenous Continuous Soraya Quintanilla MD           Family History   Problem Relation Age of Onset    Diabetes Mother     Heart Disease Maternal Grandmother     Colon Cancer Maternal Grandmother 52    Diabetes Maternal Aunt     Heart Disease Maternal Aunt     Diabetes Maternal Uncle     Heart Disease Maternal Uncle        Social History     Tobacco Use    Smoking status: Former     Current  packs/day: 0.50     Types: Cigarettes    Smokeless tobacco: Never   Substance Use Topics    Alcohol use: No       No Known Allergies      ROS:   All systems reviewed and negative unless documented in HPI.     Physical Examination:  Vitals: BP (!) 146/82   Pulse 92   Temp 98  F (36.7  C)   Resp 16   Wt 88.8 kg (195 lb 11.2 oz)   SpO2 98%   BMI 31.08 kg/m    BMI= Body mass index is 31.08 kg/m .    GENERAL APPEARANCE: Pleasant and well appearing elderly male. Appears comfortable and in no acute distress. Alert and interactive.   HEENT: NCAT. Sclera clear.   CHEST: Normal work of breathing on room air without use of accessory muscles, no retractions. Lungs clear to auscultation without rales, rhonchi or wheezes,  CARDIOVASCULAR: regular rate and rhythm, normal S1 and S2, no S3 or S4 and no murmur, click or rub.   EXTREMITIES: warm, no lower extremity edema, no clubbing or cyanosis, warm and well perfused  NEURO: alert and oriented to person/place/time, normal speech, moves all extremities  PSYCH: mood and affect appropriate  SKIN: no ecchymoses, no rashes, warm and dry to touch      Laboratory:  CMPNo lab results found in last 7 days.  CBC  Recent Labs   Lab 11/21/24  1031   WBC 6.0   RBC 4.03*   HGB 11.8*   HCT 36.4*   MCV 90   MCH 29.3   MCHC 32.4   RDW 15.8*            EKG 1/18/23:       TTE 1/18/23:  Global and regional left ventricular function is normal with an EF of 55-60%.  Right ventricular function, chamber size, wall motion, and thickness are  normal.  Both atria appear normal.  The inferior vena cava is normal.  No pericardial effusion is present.  There is no prior study for direct comparison.

## 2024-11-21 NOTE — DISCHARGE INSTRUCTIONS
Going Home after an Angioplasty or Stent Placement (Cardiac)        After you go home:  Have an adult stay with you for 24 hours.  Drink plenty of fluids.  You may eat your normal diet, unless your doctor tells you otherwise.  For 24 hours:  - Relax and take it easy.  - Do NOT smoke.  - Do NOT make any important or legal decisions.  - Do NOT drive or operate machines at home or at work.  - Do NOT drink alcohol.    Remove the Band-Aid after 24 hours. If there is minor oozing, apply another Band-aid and remove it after 12 hours.  For 2 days, do NOT have sex or do any heavy exercise.  Do NOT take a bath, or use a hot tub or pool for at least 3 days. You may shower.    Care of groin site  It is normal to have a small bruise or lump at the site.  Do NOT scrub the site.  For the first 2 days, when you cough, sneeze or move your bowels, hold your hand over the puncture site and press gently.  Do NOT lift more than 10 pounds for at least 3 to 5 days.  Do not use lotion or powder near the puncture site for 3 days.  If you start bleeding from the site in your groin, lie down flat and press firmly on the site. Call  your doctor as soon as you can.    Call your doctor if:  You have a large or growing hard lump around the site.    The site is red, swollen, hot or tender.  Blood or fluid is draining from the site.  You have chills or a fever greater than 101 F (38 C).  Your leg or arm turns bluish, feels numb or cool.  You have hives, a rash or unusual itching.  Call 911 right away if you have:   Bleeding that does not stop.   Heavy bleeding.  [facility] [place]:  [phone] (7 days a week)   Feeding and  care were discussed today and parent questions were answered

## 2024-11-21 NOTE — PROGRESS NOTES
D/I/A: Pt roomed on 2A in room 11.  Arrived via litter and accompanied by staff, Rn On/Off: On monitor.  VSSA.  Rhythm upon arrival SR on monitor.  Denies pain or sob.  Reviewed activity restrictions and when to notify RN, ie-changes to breathing or increased chest pressure or chest pain.  CCL access:  R groin CDI. BG of 39 in cath lab with intervention. Blood glucose recheck 153 upon arrival to 2A.   P: Continue to monitor status.  Discharge to home once meeting criteria.

## 2024-11-21 NOTE — PRE-PROCEDURE
Consenting/Education for Cardiology Procedure: Possible percutaneous intervention and Coronary angiogram    Patient understands we would like to perform the listed procedure(s) due to pre kidney/panc transplant workup.    The patient understands the following:     The procedure was described to the patient in detail.    Moderate sedation is required for this procedure and the risks, benefits and alternatives to moderate sedation were discussed. Patient also understands risks and complications of the procedure which include but are not limited to bruising/swelling around the incision site, infection, bleeding, allergic reaction to local anesthetic, air embolism, arterial puncture, stroke, heart attack, need for emergency surgery, death.    Patient verbalized understanding of risks and benefits and has elected to proceed with the procedure or procedures listed above.    Clinically Significant Risk Factors Present on Admission         # Hypochloremia: Lowest Cl = 96 mmol/L in last 2 days, will monitor as appropriate          # Hypertension: Noted on problem list       Nephrology: ESRD on dialysis    Jesica Heart PA-C  Cardiology

## 2024-11-25 ENCOUNTER — TELEPHONE (OUTPATIENT)
Dept: TRANSPLANT | Facility: CLINIC | Age: 34
End: 2024-11-25
Payer: COMMERCIAL

## 2024-11-25 ENCOUNTER — TELEPHONE (OUTPATIENT)
Dept: CARDIOLOGY | Facility: CLINIC | Age: 34
End: 2024-11-25
Payer: COMMERCIAL

## 2024-11-25 LAB
ATRIAL RATE - MUSE: 84 BPM
DIASTOLIC BLOOD PRESSURE - MUSE: NORMAL MMHG
INTERPRETATION ECG - MUSE: NORMAL
P AXIS - MUSE: 28 DEGREES
PR INTERVAL - MUSE: 164 MS
QRS DURATION - MUSE: 76 MS
QT - MUSE: 404 MS
QTC - MUSE: 477 MS
R AXIS - MUSE: -22 DEGREES
SYSTOLIC BLOOD PRESSURE - MUSE: NORMAL MMHG
T AXIS - MUSE: 147 DEGREES
VENTRICULAR RATE- MUSE: 84 BPM

## 2025-02-10 ENCOUNTER — TELEPHONE (OUTPATIENT)
Dept: TRANSPLANT | Facility: CLINIC | Age: 35
End: 2025-02-10
Payer: COMMERCIAL

## 2025-02-10 NOTE — TELEPHONE ENCOUNTER
Called pt to touch base on pre-kidney transplant evaluation status. Left VM requesting return call to direct line.

## 2025-02-25 ENCOUNTER — TELEPHONE (OUTPATIENT)
Dept: TRANSPLANT | Facility: CLINIC | Age: 35
End: 2025-02-25
Payer: COMMERCIAL

## 2025-02-25 NOTE — TELEPHONE ENCOUNTER
Called pt to touch base on pre-kidney transplant evaluation status. Provided direct line for return call.

## 2025-06-12 ENCOUNTER — TELEPHONE (OUTPATIENT)
Dept: TRANSPLANT | Facility: CLINIC | Age: 35
End: 2025-06-12
Payer: COMMERCIAL

## 2025-07-09 ENCOUNTER — TELEPHONE (OUTPATIENT)
Dept: TRANSPLANT | Facility: CLINIC | Age: 35
End: 2025-07-09
Payer: COMMERCIAL

## 2025-07-09 NOTE — TELEPHONE ENCOUNTER
Called to see if patient has completed his dental work so we can get a dental clearance letter from them and schedule [facility] appointments for the patient. Left  with direct line for return call.

## 2025-07-10 ENCOUNTER — TELEPHONE (OUTPATIENT)
Dept: TRANSPLANT | Facility: CLINIC | Age: 35
End: 2025-07-10
Payer: COMMERCIAL

## 2025-08-28 DIAGNOSIS — R69 DIAGNOSIS UNKNOWN: Primary | ICD-10-CM

## (undated) DEVICE — CATH ANGIO INFINITI JL4 4FRX100CM 538420

## (undated) DEVICE — INTRO SHEATH AVANTI 4FRX23CM 504604T

## (undated) DEVICE — KIT HAND CONTROL ACIST 014644 AR-P54

## (undated) DEVICE — SHTH INTRO 0.021IN ID 6FR DIA

## (undated) DEVICE — TUBING PRESSURE 30"

## (undated) DEVICE — CATH ANGIO INFINITI 3DRC 4FRX100CM 538476

## (undated) DEVICE — MANIFOLD KIT ANGIO AUTOMATED 014613

## (undated) DEVICE — PACK HEART LEFT CUSTOM

## (undated) RX ORDER — HYDRALAZINE HYDROCHLORIDE 20 MG/ML
INJECTION INTRAMUSCULAR; INTRAVENOUS
Status: DISPENSED
Start: 2024-11-21

## (undated) RX ORDER — FENTANYL CITRATE 50 UG/ML
INJECTION, SOLUTION INTRAMUSCULAR; INTRAVENOUS
Status: DISPENSED
Start: 2024-11-21

## (undated) RX ORDER — DEXTROSE MONOHYDRATE 25 G/50ML
INJECTION, SOLUTION INTRAVENOUS
Status: DISPENSED
Start: 2024-11-21

## (undated) RX ORDER — ACETAMINOPHEN 325 MG/1
TABLET ORAL
Status: DISPENSED
Start: 2024-11-21